# Patient Record
Sex: MALE | Race: WHITE | NOT HISPANIC OR LATINO | Employment: FULL TIME | URBAN - METROPOLITAN AREA
[De-identification: names, ages, dates, MRNs, and addresses within clinical notes are randomized per-mention and may not be internally consistent; named-entity substitution may affect disease eponyms.]

---

## 2018-07-10 ENCOUNTER — OFFICE VISIT (OUTPATIENT)
Dept: OCCUPATIONAL MEDICINE | Facility: CLINIC | Age: 23
End: 2018-07-10

## 2018-07-10 VITALS
TEMPERATURE: 97 F | BODY MASS INDEX: 20.76 KG/M2 | SYSTOLIC BLOOD PRESSURE: 109 MMHG | WEIGHT: 145 LBS | DIASTOLIC BLOOD PRESSURE: 63 MMHG | HEIGHT: 70 IN | RESPIRATION RATE: 18 BRPM | HEART RATE: 76 BPM | OXYGEN SATURATION: 99 %

## 2018-07-10 DIAGNOSIS — Z02.83 ENCOUNTER FOR EMPLOYMENT-RELATED DRUG TESTING: Primary | ICD-10-CM

## 2018-07-10 DIAGNOSIS — S00.81XA ABRASION OF FACE, INITIAL ENCOUNTER: ICD-10-CM

## 2018-07-10 DIAGNOSIS — S05.01XA CORNEA ABRASION, RIGHT, INITIAL ENCOUNTER: ICD-10-CM

## 2018-07-10 LAB
CTP QC/QA: YES
POC 5 PANEL DRUG SCREEN: NEGATIVE

## 2018-07-10 PROCEDURE — 99203 OFFICE O/P NEW LOW 30 MIN: CPT | Mod: S$GLB,,, | Performed by: FAMILY MEDICINE

## 2018-07-10 PROCEDURE — 65205 REMOVE FOREIGN BODY FROM EYE: CPT | Mod: S$GLB,,, | Performed by: PHYSICIAN ASSISTANT

## 2018-07-10 PROCEDURE — 80305 DRUG TEST PRSMV DIR OPT OBS: CPT | Mod: QW,S$GLB,, | Performed by: FAMILY MEDICINE

## 2018-07-10 NOTE — PROGRESS NOTES
Subjective:       Patient ID: Gilmar Spears is a 22 y.o. male.    Chief Complaint: Eye Pain    Pt states an hour ago frame of door at work hit side of his right eye. Denies visual changes, n/v, fever, cp, sob.      Eye Pain    The right eye is affected. This is a new problem. The current episode started today. The problem occurs constantly. The problem has been gradually worsening. The injury mechanism was a direct trauma. The pain is at a severity of 3/10. The pain is mild. There is no known exposure to pink eye. He does not wear contacts. Associated symptoms include blurred vision. Pertinent negatives include no eye redness, fever, nausea, photophobia or vomiting. He has tried nothing for the symptoms. The treatment provided no relief.     Review of Systems   Constitution: Negative for chills and fever.   HENT: Negative for congestion.    Eyes: Positive for blurred vision, pain and visual disturbance. Negative for photophobia and redness.   Gastrointestinal: Negative for nausea and vomiting.   Neurological: Negative for headaches.       Objective:      Physical Exam   Constitutional: He is oriented to person, place, and time. He appears well-developed and well-nourished.   HENT:   Head: Normocephalic and atraumatic.   Right Ear: External ear normal.   Left Ear: External ear normal.   Nose: Nose normal.   Mouth/Throat: Oropharynx is clear and moist.   Eyes: Conjunctivae and EOM are normal. Pupils are equal, round, and reactive to light. Right eye exhibits no chemosis, no discharge, no exudate and no hordeolum. Foreign body present in the right eye. Left eye exhibits no chemosis, no discharge, no exudate and no hordeolum. No foreign body present in the left eye. Right conjunctiva is not injected. Right conjunctiva has no hemorrhage. Left conjunctiva is not injected. Left conjunctiva has no hemorrhage. No scleral icterus.       Neck: Trachea normal, full passive range of motion without pain and phonation normal. Neck  supple.   Musculoskeletal: Normal range of motion.   Neurological: He is alert and oriented to person, place, and time.   Skin: Skin is warm, dry and intact.   Psychiatric: He has a normal mood and affect. His speech is normal and behavior is normal. Judgment and thought content normal. Cognition and memory are normal.   Nursing note and vitals reviewed.      Assessment:       1. Encounter for employment-related drug testing    2. Cornea abrasion, right, initial encounter    3. Abrasion of face, initial encounter        Plan:                   Follow-up if symptoms worsen or fail to improve.  Under flourecin, an FB was identified and removed using cotton tip applicator. There was an underlying corneal abrasion noted. Patient tolerated procedure well with no adverse events. He may RTC as needed, no work restrictions.     Patient Instructions     Artificial Tears eye solution  What is this medicine?  ARTIFICIAL TEARS (ahr tuh FISH omi teerz) eye solution soothes irritation and discomfort caused by dry eyes.  How should I use this medicine?  This medicine is only for use in the eye. Do not take by mouth. Follow the directions on the label. Wash hands before and after use. Tilt the head back slightly and pull down the lower eyelid with your index finger to form a pouch. Try not to touch the tip of the dropper to your eye, fingertips, or any other surface. Squeeze the prescribed number of drops (usually one or two drops) into the pouch. Close the eye gently for a few moments to allow the drops to be in contact with the eye. Use your medicine at regular intervals. Do not use your medicine more often than directed.  Talk to your pediatrician regarding the use of this medicine in children. While this medicine may be used in children as young as 6 years for selected conditions, precautions do apply.  What side effects may I notice from receiving this medicine?  Side effects that you should report to your doctor or health  care professional as soon as possible:  · allergic reactions like skin rash, itching or hives, swelling of the face, lips, or tongue  · change in vision  · eye irritation or redness that gets worse or lasts more than 72 hours  · eye pain  Side effects that usually do not require medical attention (report to your doctor or health care professional if they continue or are bothersome):  · temporary stinging or blurred vision when applying the eye drops  What may interact with this medicine?  Interactions are not expected. If you are using other eye drops with this medicine, separate the application of the different eye drops by roughly 5 minutes. This ensures that the eye drops do not interfere with each other. If you are using both eye drops and an eye ointment, use the eye drops 10 minutes before the eye ointment so that the eye ointment does not interfere with the action of the drops.  What if I miss a dose?  If you miss a dose, use it as soon as you can. If it is almost time for your next dose, use only that dose. Do not use double or extra doses.  Where should I keep my medicine?  Keep out of the reach of children.  Store at room temperature between 15 and 30 degrees C (59 and 86 degrees F). Do not freeze. Throw away any unused medicine after the expiration date. Once the product is opened, most experts recommend discarding the product after 30 days.  What should I tell my health care provider before I take this medicine?  · change in vision  · eye infection or trauma  · wear contact lenses  · an unusual or allergic reaction to artificial tears, other medicines, foods, dyes, or preservatives  · pregnant or trying to get pregnant  · breast-feeding  What should I watch for while using this medicine?  If you experience eye pain, changes in vision, continued redness or irritation of the eye, or if your eye condition gets worse or lasts longer than 72 hours, discontinue use and consult your health care  professional.  To avoid contamination of this product, do not touch the tip of the container to any surface. Do not share this medicine with others. If the product changes color or becomes cloudy, do not use.  If you wear contact lenses, you should remove them before putting the drops in your eyes. Wait at least 15 minutes after putting the drops in your eyes before putting your contact lenses back in.  NOTE:This sheet is a summary. It may not cover all possible information. If you have questions about this medicine, talk to your doctor, pharmacist, or health care provider. Copyright© 2017 [employer]      If you were prescribed a narcotic medication, do not drive or operate heavy equipment or machinery while taking these medications.  You must understand that you've received an Urgent Care treatment only and that you may be released before all your medical problems are known or treated. You, the patient, will arrange for follow up care as instructed.  Follow up with your PCP or specialty clinic as directed in the next 1-2 weeks if not improved or as needed.  You can call [phone] to schedule an appointment with the appropriate provider.  If your condition worsens we recommend that you receive another evaluation at the emergency room immediately or contact your primary medical clinics after hours call service to discuss your concerns.  Please return here or go to the Emergency Department for any concerns or worsening of condition.

## 2018-07-10 NOTE — PATIENT INSTRUCTIONS
Artificial Tears eye solution  What is this medicine?  ARTIFICIAL TEARS (ahr tuh FISH omi teerz) eye solution soothes irritation and discomfort caused by dry eyes.  How should I use this medicine?  This medicine is only for use in the eye. Do not take by mouth. Follow the directions on the label. Wash hands before and after use. Tilt the head back slightly and pull down the lower eyelid with your index finger to form a pouch. Try not to touch the tip of the dropper to your eye, fingertips, or any other surface. Squeeze the prescribed number of drops (usually one or two drops) into the pouch. Close the eye gently for a few moments to allow the drops to be in contact with the eye. Use your medicine at regular intervals. Do not use your medicine more often than directed.  Talk to your pediatrician regarding the use of this medicine in children. While this medicine may be used in children as young as 6 years for selected conditions, precautions do apply.  What side effects may I notice from receiving this medicine?  Side effects that you should report to your doctor or health care professional as soon as possible:  · allergic reactions like skin rash, itching or hives, swelling of the face, lips, or tongue  · change in vision  · eye irritation or redness that gets worse or lasts more than 72 hours  · eye pain  Side effects that usually do not require medical attention (report to your doctor or health care professional if they continue or are bothersome):  · temporary stinging or blurred vision when applying the eye drops  What may interact with this medicine?  Interactions are not expected. If you are using other eye drops with this medicine, separate the application of the different eye drops by roughly 5 minutes. This ensures that the eye drops do not interfere with each other. If you are using both eye drops and an eye ointment, use the eye drops 10 minutes before the eye ointment so that the eye ointment does not  interfere with the action of the drops.  What if I miss a dose?  If you miss a dose, use it as soon as you can. If it is almost time for your next dose, use only that dose. Do not use double or extra doses.  Where should I keep my medicine?  Keep out of the reach of children.  Store at room temperature between 15 and 30 degrees C (59 and 86 degrees F). Do not freeze. Throw away any unused medicine after the expiration date. Once the product is opened, most experts recommend discarding the product after 30 days.  What should I tell my health care provider before I take this medicine?  · change in vision  · eye infection or trauma  · wear contact lenses  · an unusual or allergic reaction to artificial tears, other medicines, foods, dyes, or preservatives  · pregnant or trying to get pregnant  · breast-feeding  What should I watch for while using this medicine?  If you experience eye pain, changes in vision, continued redness or irritation of the eye, or if your eye condition gets worse or lasts longer than 72 hours, discontinue use and consult your health care professional.  To avoid contamination of this product, do not touch the tip of the container to any surface. Do not share this medicine with others. If the product changes color or becomes cloudy, do not use.  If you wear contact lenses, you should remove them before putting the drops in your eyes. Wait at least 15 minutes after putting the drops in your eyes before putting your contact lenses back in.  NOTE:This sheet is a summary. It may not cover all possible information. If you have questions about this medicine, talk to your doctor, pharmacist, or health care provider. Copyright© 2017 [employer]      If you were prescribed a narcotic medication, do not drive or operate heavy equipment or machinery while taking these medications.  You must understand that you've received an Urgent Care treatment only and that you may be released before all your medical  problems are known or treated. You, the patient, will arrange for follow up care as instructed.  Follow up with your PCP or specialty clinic as directed in the next 1-2 weeks if not improved or as needed.  You can call [phone] to schedule an appointment with the appropriate provider.  If your condition worsens we recommend that you receive another evaluation at the emergency room immediately or contact your primary medical clinics after hours call service to discuss your concerns.  Please return here or go to the Emergency Department for any concerns or worsening of condition.

## 2018-07-10 NOTE — LETTER
[facility]  [address], [address]  [place] [address]  Phone: [phone]  Fax: [phone]    Pt Name: Gilmar Spears  Injury Date: 07/10/2018   Employee ID:  Date of First Treatment: 07/10/2018   Company: [employer]            Appointment Time: 02:35 PM Arrived:  2:50 PM CDT    Time Out:[id number]   Physician: [facility] PROVID[facility], [facility]        Office Treatment: Gilmar was seen today for eye pain.    Diagnoses and all orders for this visit:    Encounter for employment-related drug testing  -     POCT Rapid Drug Screen 5 Panel    Cornea abrasion, right, initial encounter    Abrasion of face, initial encounter                    Return Appointment: as needed.   Patient can RTW full duty

## 2020-03-17 ENCOUNTER — OCCUPATIONAL HEALTH (OUTPATIENT)
Dept: URGENT CARE | Facility: CLINIC | Age: 25
End: 2020-03-17

## 2020-03-17 DIAGNOSIS — Z02.1 PRE-EMPLOYMENT EXAMINATION: Primary | ICD-10-CM

## 2020-03-17 LAB
BREATH ALCOHOL: NEGATIVE
CTP QC/QA: YES
CTP QC/QA: YES
FECAL OCCULT BLOOD, POC: NEGATIVE
POC 10 PANEL DRUG SCREEN: NEGATIVE

## 2020-03-18 LAB
ALBUMIN SERPL-MCNC: 4.9 G/DL (ref 4.1–5.2)
ALBUMIN/GLOB SERPL: 1.9 {RATIO} (ref 1.2–2.2)
ALP SERPL-CCNC: 68 IU/L (ref 39–117)
ALT SERPL-CCNC: 30 IU/L (ref 0–44)
APPEARANCE UR: CLEAR
AST SERPL-CCNC: 28 IU/L (ref 0–40)
BASOPHILS # BLD AUTO: 0 X10E3/UL (ref 0–0.2)
BASOPHILS NFR BLD AUTO: 0 %
BILIRUB SERPL-MCNC: 0.4 MG/DL (ref 0–1.2)
BILIRUB UR QL STRIP: NEGATIVE
BUN SERPL-MCNC: 15 MG/DL (ref 6–20)
BUN/CREAT SERPL: 16 (ref 9–20)
CALCIUM SERPL-MCNC: 9.7 MG/DL (ref 8.7–10.2)
CHLORIDE SERPL-SCNC: 101 MMOL/L (ref 96–106)
CHOLEST SERPL-MCNC: 143 MG/DL (ref 100–199)
CO2 SERPL-SCNC: 22 MMOL/L (ref 20–29)
COLOR UR: YELLOW
CREAT SERPL-MCNC: 0.92 MG/DL (ref 0.76–1.27)
EOSINOPHIL # BLD AUTO: 0.2 X10E3/UL (ref 0–0.4)
EOSINOPHIL NFR BLD AUTO: 3 %
ERYTHROCYTE [DISTWIDTH] IN BLOOD BY AUTOMATED COUNT: 13.7 % (ref 11.6–15.4)
GGT SERPL-CCNC: 20 IU/L (ref 0–65)
GLOBULIN SER CALC-MCNC: 2.6 G/DL (ref 1.5–4.5)
GLUCOSE SERPL-MCNC: 94 MG/DL (ref 65–99)
GLUCOSE UR QL: NEGATIVE
HCT VFR BLD AUTO: 43.7 % (ref 37.5–51)
HDLC SERPL-MCNC: 38 MG/DL
HGB BLD-MCNC: 14.4 G/DL (ref 13–17.7)
HGB UR QL STRIP: NEGATIVE
HIV 1+2 AB+HIV1 P24 AG SERPL QL IA: NON REACTIVE
IMM GRANULOCYTES # BLD AUTO: 0 X10E3/UL (ref 0–0.1)
IMM GRANULOCYTES NFR BLD AUTO: 0 %
KETONES UR QL STRIP: NEGATIVE
LDLC SERPL CALC-MCNC: 78 MG/DL (ref 0–99)
LEUKOCYTE ESTERASE UR QL STRIP: NEGATIVE
LYMPHOCYTES # BLD AUTO: 1.8 X10E3/UL (ref 0.7–3.1)
LYMPHOCYTES NFR BLD AUTO: 25 %
MCH RBC QN AUTO: 31.8 PG (ref 26.6–33)
MCHC RBC AUTO-ENTMCNC: 33 G/DL (ref 31.5–35.7)
MCV RBC AUTO: 97 FL (ref 79–97)
MICRO URNS: NORMAL
MONOCYTES # BLD AUTO: 0.5 X10E3/UL (ref 0.1–0.9)
MONOCYTES NFR BLD AUTO: 7 %
NEUTROPHILS # BLD AUTO: 4.6 X10E3/UL (ref 1.4–7)
NEUTROPHILS NFR BLD AUTO: 65 %
NITRITE UR QL STRIP: NEGATIVE
PH UR STRIP: 7 [PH] (ref 5–7.5)
PLATELET # BLD AUTO: 188 X10E3/UL (ref 150–450)
POTASSIUM SERPL-SCNC: 4.7 MMOL/L (ref 3.5–5.2)
PROT SERPL-MCNC: 7.5 G/DL (ref 6–8.5)
PROT UR QL STRIP: NEGATIVE
RBC # BLD AUTO: 4.53 X10E6/UL (ref 4.14–5.8)
RPR SER QL: NON REACTIVE
SODIUM SERPL-SCNC: 143 MMOL/L (ref 134–144)
SP GR UR: 1.02 (ref 1–1.03)
TRIGL SERPL-MCNC: 133 MG/DL (ref 0–149)
UROBILINOGEN UR STRIP-MCNC: 0.2 MG/DL (ref 0.2–1)
VLDLC SERPL CALC-MCNC: 27 MG/DL (ref 5–40)
WBC # BLD AUTO: 7.2 X10E3/UL (ref 3.4–10.8)

## 2021-05-12 ENCOUNTER — PATIENT MESSAGE (OUTPATIENT)
Dept: RESEARCH | Facility: HOSPITAL | Age: 26
End: 2021-05-12

## 2024-02-22 ENCOUNTER — TELEPHONE (OUTPATIENT)
Dept: NEUROSURGERY | Facility: CLINIC | Age: 29
End: 2024-02-22
Payer: COMMERCIAL

## 2024-02-22 NOTE — TELEPHONE ENCOUNTER
LVM in regards to referral from Jenny Delacruz.  Patient does have current CTA, but needs to be uploaded to chart.  No current imaging in chart.  Referral scanned into .

## 2024-02-23 ENCOUNTER — PATIENT MESSAGE (OUTPATIENT)
Dept: NEUROSURGERY | Facility: CLINIC | Age: 29
End: 2024-02-23
Payer: COMMERCIAL

## 2024-02-23 ENCOUNTER — TELEPHONE (OUTPATIENT)
Dept: NEUROSURGERY | Facility: CLINIC | Age: 29
End: 2024-02-23
Payer: COMMERCIAL

## 2024-02-23 NOTE — TELEPHONE ENCOUNTER
PP message sent.  Unable to contact patient.  Girlfriend is not in patient documents to be able to speak to in regards to patient care.    ----- Message from Sonam Ahmadi sent at 2/23/2024 11:05 AM CST -----  Type: Need Medical Advice   Who Called: Baldomero girlfriend of patient  Best callback number :   Additional Information: girlfriend of patient called to schedule the appointment due to the patient working and not being able to answer his phone   Please call to further assist, Thanks.

## 2024-03-08 ENCOUNTER — TELEPHONE (OUTPATIENT)
Dept: NEUROSURGERY | Facility: CLINIC | Age: 29
End: 2024-03-08
Payer: COMMERCIAL

## 2024-03-08 NOTE — TELEPHONE ENCOUNTER
LVM in regards to imaging.  Needs to bring to [facility] to be uploaded before appt on Thursday.

## 2024-03-14 ENCOUNTER — OFFICE VISIT (OUTPATIENT)
Dept: NEUROSURGERY | Facility: CLINIC | Age: 29
End: 2024-03-14
Payer: COMMERCIAL

## 2024-03-14 VITALS
WEIGHT: 147.06 LBS | HEIGHT: 71 IN | SYSTOLIC BLOOD PRESSURE: 123 MMHG | HEART RATE: 79 BPM | RESPIRATION RATE: 18 BRPM | DIASTOLIC BLOOD PRESSURE: 74 MMHG | BODY MASS INDEX: 20.59 KG/M2

## 2024-03-14 DIAGNOSIS — Q28.2 ARTERIOVENOUS MALFORMATION OF CEREBRAL VESSELS: Primary | ICD-10-CM

## 2024-03-14 PROCEDURE — 3008F BODY MASS INDEX DOCD: CPT | Mod: CPTII,S$GLB,, | Performed by: NEUROLOGICAL SURGERY

## 2024-03-14 PROCEDURE — 3074F SYST BP LT 130 MM HG: CPT | Mod: CPTII,S$GLB,, | Performed by: NEUROLOGICAL SURGERY

## 2024-03-14 PROCEDURE — 1159F MED LIST DOCD IN RCRD: CPT | Mod: CPTII,S$GLB,, | Performed by: NEUROLOGICAL SURGERY

## 2024-03-14 PROCEDURE — 3078F DIAST BP <80 MM HG: CPT | Mod: CPTII,S$GLB,, | Performed by: NEUROLOGICAL SURGERY

## 2024-03-14 PROCEDURE — 99205 OFFICE O/P NEW HI 60 MIN: CPT | Mod: S$GLB,,, | Performed by: NEUROLOGICAL SURGERY

## 2024-03-14 RX ORDER — DOXYCYCLINE 100 MG/1
100 CAPSULE ORAL EVERY 12 HOURS
Status: ON HOLD | COMMUNITY
Start: 2024-03-10 | End: 2024-06-15 | Stop reason: HOSPADM

## 2024-03-14 NOTE — PROGRESS NOTES
Neurosurgery History & Physical    Patient ID: Gilmar Spears is a 28 y.o. male.    No chief complaint on file.      HPI:  Mr. Spears is a 28 year old gentleman with history of chronic headaches who more recently (for maybe the last 6 months) has had throbbing headaches on the left side of his head centered around his temple.  His last significant one was on Sunday (4 days ago).  Prior to that it had been a month prior.  Sometimes he feels like significant straining or laughing will bring on the headaches.      He denies weakness/numnbess/vision changes or other issues.    He has never seen a headaches specialist.    He does not smoke cigarettes but does vape.    He does not have high blood pressure.    His mother suffered a ruptured brain aneurysm two years ago. They are not aware of other family history of brain aneurysms.    Review of Systems   Constitutional:  Negative for activity change and fever.   HENT:  Negative for rhinorrhea, tinnitus, trouble swallowing and voice change.    Eyes:  Negative for visual disturbance.   Respiratory:  Negative for shortness of breath.    Cardiovascular:  Negative for chest pain.   Gastrointestinal:  Negative for nausea and vomiting.   Endocrine: Negative for cold intolerance, heat intolerance, polydipsia, polyphagia and polyuria.   Genitourinary:  Negative for decreased urine volume, frequency and urgency.   Musculoskeletal:  Negative for back pain, neck pain and neck stiffness.   Neurological:  Positive for headaches. Negative for dizziness, tremors, seizures, syncope, facial asymmetry, speech difficulty, weakness, light-headedness and numbness.   Psychiatric/Behavioral:  Negative for confusion.        No past medical history on file.  Social History     Socioeconomic History    Marital status: Single   Tobacco Use    Smoking status: Smoker, Current Status Unknown     Types: Cigarettes    Smokeless tobacco: Never     Family History   Problem Relation Age of Onset    No Known  Problems Mother     No Known Problems Father      Review of patient's allergies indicates:   Allergen Reactions    Amoxicillin Swelling     No current outpatient medications on file.  There were no vitals taken for this visit.      Neurologic Exam     Mental Status   Oriented to person, place, and time.   Attention: normal.   Speech: speech is normal   Level of consciousness: alert  Knowledge: good.     Cranial Nerves     CN II   Visual fields full to confrontation.     CN III, IV, VI   Pupils are equal, round, and reactive to light.  Extraocular motions are normal.     CN V   Facial sensation intact.     CN VII   Facial expression full, symmetric.     CN VIII   CN VIII normal.     CN XI   CN XI normal.     CN XII   CN XII normal.     Motor Exam   Muscle bulk: normal  Overall muscle tone: normal    Strength   Strength 5/5 throughout.     Sensory Exam   Light touch normal.     Gait, Coordination, and Reflexes     Gait  Gait: normal    Coordination   Romberg: negative      Physical Exam  Eyes:      Extraocular Movements: EOM normal.      Pupils: Pupils are equal, round, and reactive to light.   Neurological:      Mental Status: He is oriented to person, place, and time.      Motor: Motor strength is normal.     Coordination: Romberg Test normal.      Gait: Gait is intact.   Psychiatric:         Speech: Speech normal.       Imaging:  CT angiogram of the head dated 02/13/2024 is personally reviewed and discussed with the patient.  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  This measures approximately 3.5 mm in maximal dimension.    Assessment/Plan:   Mr. 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      Plan is to follow-up in two months with MRA head with and without contrast.      I discussed the natural history of unruptured intracranial aneurysms.  I quoted him an approximately 0.5% annual risk of  bleeding in accordance with ISUIA data.      I discussed modifiable risk factors including hypertension control and avoiding smoking.

## 2024-05-15 ENCOUNTER — OFFICE VISIT (OUTPATIENT)
Dept: NEUROSURGERY | Facility: CLINIC | Age: 29
End: 2024-05-15
Payer: COMMERCIAL

## 2024-05-15 ENCOUNTER — HOSPITAL ENCOUNTER (OUTPATIENT)
Dept: RADIOLOGY | Facility: HOSPITAL | Age: 29
Discharge: HOME | End: 2024-05-15
Attending: NEUROLOGICAL SURGERY
Payer: COMMERCIAL

## 2024-05-15 VITALS
HEART RATE: 83 BPM | SYSTOLIC BLOOD PRESSURE: 113 MMHG | BODY MASS INDEX: 20.51 KG/M2 | DIASTOLIC BLOOD PRESSURE: 64 MMHG | HEIGHT: 71 IN | RESPIRATION RATE: 18 BRPM

## 2024-05-15 DIAGNOSIS — Q28.2 ARTERIOVENOUS MALFORMATION OF CEREBRAL VESSELS: ICD-10-CM

## 2024-05-15 DIAGNOSIS — I67.1 BRAIN ANEURYSM: Primary | ICD-10-CM

## 2024-05-15 PROCEDURE — 70546 MR ANGIOGRAPH HEAD W/O&W/DYE: CPT | Mod: TC,PO

## 2024-05-15 PROCEDURE — 3008F BODY MASS INDEX DOCD: CPT | Mod: CPTII,S$GLB,, | Performed by: NEUROLOGICAL SURGERY

## 2024-05-15 PROCEDURE — A9585 GADOBUTROL INJECTION: HCPCS | Mod: PO | Performed by: NEUROLOGICAL SURGERY

## 2024-05-15 PROCEDURE — 3074F SYST BP LT 130 MM HG: CPT | Mod: CPTII,S$GLB,, | Performed by: NEUROLOGICAL SURGERY

## 2024-05-15 PROCEDURE — 70546 MR ANGIOGRAPH HEAD W/O&W/DYE: CPT | Mod: 26,,, | Performed by: RADIOLOGY

## 2024-05-15 PROCEDURE — 1159F MED LIST DOCD IN RCRD: CPT | Mod: CPTII,S$GLB,, | Performed by: NEUROLOGICAL SURGERY

## 2024-05-15 PROCEDURE — 3078F DIAST BP <80 MM HG: CPT | Mod: CPTII,S$GLB,, | Performed by: NEUROLOGICAL SURGERY

## 2024-05-15 PROCEDURE — 99215 OFFICE O/P EST HI 40 MIN: CPT | Mod: S$GLB,,, | Performed by: NEUROLOGICAL SURGERY

## 2024-05-15 PROCEDURE — 25500020 PHARM REV CODE 255: Mod: PO | Performed by: NEUROLOGICAL SURGERY

## 2024-05-15 RX ORDER — GADOBUTROL 604.72 MG/ML
6 INJECTION INTRAVENOUS
Status: COMPLETED | OUTPATIENT
Start: 2024-05-15 | End: 2024-05-15

## 2024-05-15 RX ADMIN — GADOBUTROL 6 ML: 604.72 INJECTION INTRAVENOUS at 09:05

## 2024-05-15 NOTE — PROGRESS NOTES
"Neurosurgery History & Physical    Patient ID: Gilmar Spears is a 28 y.o. male.    Chief Complaint   Patient presents with    Follow-up     Wants to discuss iaging and options going forward.     Interval HPI 5/15/2024:  Mr. Spears denies significant new symptoms since last visit.  He does continue to have left temporal headaches that happen more with straining.      HPI 3/14/2024:  Mr. Spears is a 28 year old gentleman with history of chronic headaches who more recently (for maybe the last 6 months) has had throbbing headaches on the left side of his head centered around his temple.  His last significant one was on Sunday (4 days ago).  Prior to that it had been a month prior.  Sometimes he feels like significant straining or laughing will bring on the headaches.       He denies weakness/numnbess/vision changes or other issues.     He has never seen a headaches specialist.     He does not smoke cigarettes but does vape.     He does not have high blood pressure.     His mother suffered a ruptured brain aneurysm two years ago. They are not aware of other family history of brain aneurysms.    Review of Systems  Denies F/C/N/V.  + HA    History reviewed. No pertinent past medical history.  Social History     Socioeconomic History    Marital status: Single   Tobacco Use    Smoking status: Smoker, Current Status Unknown     Types: Cigarettes    Smokeless tobacco: Never     Family History   Problem Relation Name Age of Onset    No Known Problems Mother      No Known Problems Father       Review of patient's allergies indicates:   Allergen Reactions    Amoxicillin Swelling    Iodinated contrast media Swelling       Current Outpatient Medications:     doxycycline (MONODOX) 100 MG capsule, Take 100 mg by mouth every 12 (twelve) hours. (Patient not taking: Reported on 5/15/2024), Disp: , Rfl:   No current facility-administered medications for this visit.  Blood pressure 113/64, pulse 83, resp. rate 18, height 5' 11" (1.803 " m).      Neurologic Exam  AAOx4, NAD  Strength   Deltoids Triceps Biceps Wrist Extension Wrist Flexion Hand    Upper: R 5/5 5/5 5/5 5/5 5/5 5/5     L 5/5 5/5 5/5 5/5 5/5 5/5       Iliopsoas Quadriceps Knee  Flexion Tibialis  anterior Gastro- cnemius EHL   Lower: R 5/5 5/5 5/5 5/5 5/5 5/5     L 5/5 5/5 5/5 5/5 5/5 5/5      Sensation grossly intact to light touch in bilateral upper and lower extremities    Imaging:  MRA of the brain with and without contrast dated 05/15/2024 is personally reviewed and discussed with the patient.  There is no change in the left sided internal carotid artery aneurysm when compared to outside CTA.  The aneurysm has a wide neck and is on the superior surface of the ICA in the communicating segment.      Assessment/Plan:   Mr. Spears is a 28 year old gentleman with atypical, new headaches which are located in the left temporal area and are associated with straining.    He has a left ICA aneurysm which is not in typical location for saccular aneurysms.  He has family history of aneurysmal rupture (mother).      Given his young age and these multiple risk factors, I feel that offering pipeline embolization is reasonable.  I discussed risks of treatmetn including aneurysmal hemorrhage, remote hemorrhage, stroke, coma and death.      I quoted the annual risk of aneurysm rupture as less than 0.5% annually however it is unclear how much family history, atypical headaches, and atypical aneurysmal location may influence this number.    Overall the aneurysm causes quite a bit of anxiety for him and he is avoiding doing many activities he would more typically participate in.    After thorough discussion he feels like he would like to move forward with treatment.

## 2024-05-20 DIAGNOSIS — Q28.2 ARTERIOVENOUS MALFORMATION OF CEREBRAL VESSELS: Primary | ICD-10-CM

## 2024-05-21 ENCOUNTER — TELEPHONE (OUTPATIENT)
Dept: NEUROSURGERY | Facility: CLINIC | Age: 29
End: 2024-05-21
Payer: COMMERCIAL

## 2024-05-21 NOTE — TELEPHONE ENCOUNTER
----- Message from Obdulia Clements LPN sent at 5/17/2024  3:59 PM CDT -----  Regarding: FW: advice    ----- Message -----  From: Yuriy Kelley  Sent: 5/17/2024   2:52 PM CDT  To: [facility] Staff  Subject: advice                                           Type: Needs Medical Advice  Who Called:  Baldomero farnsworth  Symptoms (please be specific):    How long has patient had these symptoms:    Pharmacy name and phone #:    Best Call Back Number: [phone]  Additional Information: She is calling to notify the provider that the pt would like to go through with the procedure. Please call to advise . Thanks

## 2024-05-29 ENCOUNTER — TELEPHONE (OUTPATIENT)
Dept: NEUROSURGERY | Facility: CLINIC | Age: 29
End: 2024-05-29
Payer: COMMERCIAL

## 2024-05-29 DIAGNOSIS — Q28.2 ARTERIOVENOUS MALFORMATION OF CEREBRAL VESSELS: Primary | ICD-10-CM

## 2024-05-31 ENCOUNTER — TELEPHONE (OUTPATIENT)
Dept: NEUROSURGERY | Facility: CLINIC | Age: 29
End: 2024-05-31
Payer: COMMERCIAL

## 2024-05-31 DIAGNOSIS — Q28.2 ARTERIOVENOUS MALFORMATION OF CEREBRAL VESSELS: Primary | ICD-10-CM

## 2024-05-31 RX ORDER — ASPIRIN 81 MG/1
81 TABLET ORAL DAILY
Qty: 14 TABLET | Refills: 0 | Status: SHIPPED | OUTPATIENT
Start: 2024-05-31 | End: 2024-06-13 | Stop reason: ALTCHOICE

## 2024-05-31 RX ORDER — CLOPIDOGREL BISULFATE 75 MG/1
75 TABLET ORAL DAILY
Qty: 14 TABLET | Refills: 0 | Status: SHIPPED | OUTPATIENT
Start: 2024-05-31 | End: 2024-06-05

## 2024-06-03 ENCOUNTER — TELEPHONE (OUTPATIENT)
Dept: NEUROSURGERY | Facility: CLINIC | Age: 29
End: 2024-06-03
Payer: COMMERCIAL

## 2024-06-03 NOTE — TELEPHONE ENCOUNTER
-pt trinh called stating that pt has broken out in hives and started to itch since starting plavix and asprin that was prescribed on 5/31/24  -itching and hives started the next night on 6/01/24 before taking his shower and alfredneto stated they have not changed anything else  -pt is still breathing normally and has taken a benydril which helped hives and itching go down  -told pt trinh that if symptoms worsen to go to the ER/call 911 immediately   -asked pt trinh if he has ever taken asprin and she stated that she believes so but is unsure  -instructed her to tell him to stop taking both medications for now   -states that pt head has been hurting more and increased this week     -messaged FAREED Kim

## 2024-06-05 ENCOUNTER — TELEPHONE (OUTPATIENT)
Dept: NEUROSURGERY | Facility: CLINIC | Age: 29
End: 2024-06-05
Payer: COMMERCIAL

## 2024-06-05 DIAGNOSIS — Q28.2 ARTERIOVENOUS MALFORMATION OF CEREBRAL VESSELS: Primary | ICD-10-CM

## 2024-06-05 NOTE — TELEPHONE ENCOUNTER
I spoke Mr. SpearsMaegan tsang today who reports that he has been taking the Aspirin 81mg without issue since Monday 6/3.  I informed her that I am prescribing Brilinta to replace the Plavix.  She is aware that the medication was e-prescribed to the pharmacy on file.  I advised that she should call 911 if any allergic response develops.  She verbalized understanding.

## 2024-06-06 DIAGNOSIS — Z91.041 ALLERGY TO IODINATED CONTRAST: Primary | ICD-10-CM

## 2024-06-06 RX ORDER — PREDNISONE 50 MG/1
50 TABLET ORAL
Qty: 3 TABLET | Refills: 0 | Status: ON HOLD | OUTPATIENT
Start: 2024-06-06 | End: 2024-06-15 | Stop reason: HOSPADM

## 2024-06-06 RX ORDER — DIPHENHYDRAMINE HCL 50 MG
50 CAPSULE ORAL ONCE
Qty: 1 CAPSULE | Refills: 0 | Status: SHIPPED | OUTPATIENT
Start: 2024-06-06 | End: 2024-06-06

## 2024-06-07 ENCOUNTER — TELEPHONE (OUTPATIENT)
Dept: INTERVENTIONAL RADIOLOGY/VASCULAR | Facility: CLINIC | Age: 29
End: 2024-06-07
Payer: COMMERCIAL

## 2024-06-11 ENCOUNTER — LAB VISIT (OUTPATIENT)
Dept: LAB | Facility: HOSPITAL | Age: 29
End: 2024-06-11
Payer: COMMERCIAL

## 2024-06-11 DIAGNOSIS — Q28.2 ARTERIOVENOUS MALFORMATION, CEREBRAL: Primary | ICD-10-CM

## 2024-06-11 DIAGNOSIS — Q28.2 ARTERIOVENOUS MALFORMATION, CEREBRAL: ICD-10-CM

## 2024-06-11 DIAGNOSIS — Q28.2 ARTERIOVENOUS MALFORMATION OF CEREBRAL VESSELS: Primary | ICD-10-CM

## 2024-06-12 ENCOUNTER — DOCUMENTATION ONLY (OUTPATIENT)
Dept: PREADMISSION TESTING | Facility: HOSPITAL | Age: 29
End: 2024-06-12
Payer: COMMERCIAL

## 2024-06-12 ENCOUNTER — PATIENT MESSAGE (OUTPATIENT)
Dept: INTERVENTIONAL RADIOLOGY/VASCULAR | Facility: HOSPITAL | Age: 29
End: 2024-06-12
Payer: COMMERCIAL

## 2024-06-12 NOTE — PRE-PROCEDURE INSTRUCTIONS
PRE-OP INSTRUCTIONS:  Instructed patient to have no food,milk or milk products after midnight 6/13/2024  It is ok to take AM medications with a few sips of water   Medication instructions for pm prior to and am of surgery reviewed.  Instructed patient to avoid taking vitamins,supplements or ibuprofen the am of surgery.  Shower instructions provided    Patient denies any familial side effects or issues with anesthesia or sedation. This will be the patient's first anesthesia

## 2024-06-13 ENCOUNTER — ANESTHESIA EVENT (OUTPATIENT)
Dept: INTERVENTIONAL RADIOLOGY/VASCULAR | Facility: HOSPITAL | Age: 29
DRG: 027 | End: 2024-06-13
Payer: COMMERCIAL

## 2024-06-13 DIAGNOSIS — Q28.2 ARTERIOVENOUS MALFORMATION OF CEREBRAL VESSELS: Primary | ICD-10-CM

## 2024-06-13 RX ORDER — ASPIRIN 325 MG
325 TABLET ORAL DAILY
Qty: 30 TABLET | Refills: 5 | Status: SHIPPED | OUTPATIENT
Start: 2024-06-15

## 2024-06-14 ENCOUNTER — HOSPITAL ENCOUNTER (INPATIENT)
Dept: INTERVENTIONAL RADIOLOGY/VASCULAR | Facility: HOSPITAL | Age: 29
LOS: 1 days | Discharge: HOME | DRG: 027 | End: 2024-06-15
Attending: NEUROLOGICAL SURGERY | Admitting: PSYCHIATRY & NEUROLOGY
Payer: COMMERCIAL

## 2024-06-14 ENCOUNTER — ANESTHESIA (OUTPATIENT)
Dept: INTERVENTIONAL RADIOLOGY/VASCULAR | Facility: HOSPITAL | Age: 29
DRG: 027 | End: 2024-06-14
Payer: COMMERCIAL

## 2024-06-14 DIAGNOSIS — I67.1 CEREBRAL ANEURYSM WITHOUT RUPTURE: Primary | ICD-10-CM

## 2024-06-14 DIAGNOSIS — Q28.2 ARTERIOVENOUS MALFORMATION OF CEREBRAL VESSELS: ICD-10-CM

## 2024-06-14 LAB — POCT GLUCOSE: 142 MG/DL (ref 70–110)

## 2024-06-14 PROCEDURE — 61624 TCAT PERM OCCLS/EMBOLJ CNS: CPT | Performed by: NEUROLOGICAL SURGERY

## 2024-06-14 PROCEDURE — C1760 CLOSURE DEV, VASC: HCPCS

## 2024-06-14 PROCEDURE — 25000242 PHARM REV CODE 250 ALT 637 W/ HCPCS: Performed by: PHYSICIAN ASSISTANT

## 2024-06-14 PROCEDURE — 20000000 HC ICU ROOM

## 2024-06-14 PROCEDURE — B317YZZ FLUOROSCOPY OF LEFT INTERNAL CAROTID ARTERY USING OTHER CONTRAST: ICD-10-PCS | Performed by: NEUROLOGICAL SURGERY

## 2024-06-14 PROCEDURE — 27201082 IR ANGIOGRAM CAROTID INTERNAL INC ARCH AND CEREBRAL BILAT

## 2024-06-14 PROCEDURE — 75894 X-RAYS TRANSCATH THERAPY: CPT | Mod: TC | Performed by: NEUROLOGICAL SURGERY

## 2024-06-14 PROCEDURE — 75894 X-RAYS TRANSCATH THERAPY: CPT | Mod: 26,,, | Performed by: NEUROLOGICAL SURGERY

## 2024-06-14 PROCEDURE — 37000008 HC ANESTHESIA 1ST 15 MINUTES: Performed by: ANESTHESIOLOGY

## 2024-06-14 PROCEDURE — 61624 TCAT PERM OCCLS/EMBOLJ CNS: CPT | Mod: ,,, | Performed by: NEUROLOGICAL SURGERY

## 2024-06-14 PROCEDURE — 37000009 HC ANESTHESIA EA ADD 15 MINS: Performed by: ANESTHESIOLOGY

## 2024-06-14 PROCEDURE — 27201076 IR ANGIOGRAM CAROTID INTERNAL INC ARCH AND CEREBRAL BILAT

## 2024-06-14 PROCEDURE — 25500020 PHARM REV CODE 255: Performed by: NEUROLOGICAL SURGERY

## 2024-06-14 PROCEDURE — 99223 1ST HOSP IP/OBS HIGH 75: CPT | Mod: ,,, | Performed by: PHYSICIAN ASSISTANT

## 2024-06-14 PROCEDURE — 03VG3HZ RESTRICTION OF INTRACRANIAL ARTERY WITH INTRALUMINAL DEVICE, FLOW DIVERTER, PERCUTANEOUS APPROACH: ICD-10-PCS | Performed by: NEUROLOGICAL SURGERY

## 2024-06-14 PROCEDURE — 25000003 PHARM REV CODE 250: Performed by: NEUROLOGICAL SURGERY

## 2024-06-14 PROCEDURE — 75898 FOLLOW-UP ANGIOGRAPHY: CPT | Mod: TC | Performed by: NEUROLOGICAL SURGERY

## 2024-06-14 PROCEDURE — B314YZZ FLUOROSCOPY OF LEFT COMMON CAROTID ARTERY USING OTHER CONTRAST: ICD-10-PCS | Performed by: NEUROLOGICAL SURGERY

## 2024-06-14 PROCEDURE — 36224 PLACE CATH CAROTD ART: CPT | Mod: 51,LT,, | Performed by: NEUROLOGICAL SURGERY

## 2024-06-14 PROCEDURE — 94761 N-INVAS EAR/PLS OXIMETRY MLT: CPT

## 2024-06-14 PROCEDURE — 36224 PLACE CATH CAROTD ART: CPT | Mod: LT | Performed by: NEUROLOGICAL SURGERY

## 2024-06-14 PROCEDURE — C1769 GUIDE WIRE: HCPCS

## 2024-06-14 PROCEDURE — 63600175 PHARM REV CODE 636 W HCPCS: Performed by: NURSE ANESTHETIST, CERTIFIED REGISTERED

## 2024-06-14 PROCEDURE — 25000003 PHARM REV CODE 250: Performed by: NURSE ANESTHETIST, CERTIFIED REGISTERED

## 2024-06-14 PROCEDURE — 76377 3D RENDER W/INTRP POSTPROCES: CPT | Mod: 26,,, | Performed by: NEUROLOGICAL SURGERY

## 2024-06-14 PROCEDURE — 76377 3D RENDER W/INTRP POSTPROCES: CPT | Mod: TC | Performed by: NEUROLOGICAL SURGERY

## 2024-06-14 PROCEDURE — 99499 UNLISTED E&M SERVICE: CPT | Mod: ,,, | Performed by: PSYCHIATRY & NEUROLOGY

## 2024-06-14 PROCEDURE — 25000003 PHARM REV CODE 250: Performed by: PHYSICIAN ASSISTANT

## 2024-06-14 PROCEDURE — 75898 FOLLOW-UP ANGIOGRAPHY: CPT | Mod: 26,,, | Performed by: NEUROLOGICAL SURGERY

## 2024-06-14 RX ORDER — PROPOFOL 10 MG/ML
VIAL (ML) INTRAVENOUS
Status: DISCONTINUED | OUTPATIENT
Start: 2024-06-14 | End: 2024-06-14

## 2024-06-14 RX ORDER — MIDAZOLAM HYDROCHLORIDE 1 MG/ML
INJECTION INTRAMUSCULAR; INTRAVENOUS
Status: DISCONTINUED | OUTPATIENT
Start: 2024-06-14 | End: 2024-06-14

## 2024-06-14 RX ORDER — LIDOCAINE HYDROCHLORIDE 10 MG/ML
1 INJECTION, SOLUTION EPIDURAL; INFILTRATION; INTRACAUDAL; PERINEURAL ONCE
Status: DISCONTINUED | OUTPATIENT
Start: 2024-06-14 | End: 2024-06-15 | Stop reason: HOSPADM

## 2024-06-14 RX ORDER — ASPIRIN 325 MG
325 TABLET ORAL DAILY
Status: DISCONTINUED | OUTPATIENT
Start: 2024-06-16 | End: 2024-06-15 | Stop reason: HOSPADM

## 2024-06-14 RX ORDER — ACETAMINOPHEN 325 MG/1
650 TABLET ORAL EVERY 4 HOURS PRN
Status: DISCONTINUED | OUTPATIENT
Start: 2024-06-14 | End: 2024-06-15 | Stop reason: HOSPADM

## 2024-06-14 RX ORDER — ROCURONIUM BROMIDE 10 MG/ML
INJECTION, SOLUTION INTRAVENOUS
Status: DISCONTINUED | OUTPATIENT
Start: 2024-06-14 | End: 2024-06-14

## 2024-06-14 RX ORDER — SODIUM CHLORIDE 0.9 % (FLUSH) 0.9 %
10 SYRINGE (ML) INJECTION
Status: DISCONTINUED | OUTPATIENT
Start: 2024-06-14 | End: 2024-06-15 | Stop reason: HOSPADM

## 2024-06-14 RX ORDER — FENTANYL CITRATE 50 UG/ML
25 INJECTION, SOLUTION INTRAMUSCULAR; INTRAVENOUS EVERY 5 MIN PRN
OUTPATIENT
Start: 2024-06-14

## 2024-06-14 RX ORDER — SODIUM CHLORIDE 9 MG/ML
INJECTION, SOLUTION INTRAVENOUS CONTINUOUS
Status: DISCONTINUED | OUTPATIENT
Start: 2024-06-14 | End: 2024-06-14

## 2024-06-14 RX ORDER — ONDANSETRON HYDROCHLORIDE 2 MG/ML
INJECTION, SOLUTION INTRAVENOUS
Status: DISCONTINUED | OUTPATIENT
Start: 2024-06-14 | End: 2024-06-14

## 2024-06-14 RX ORDER — HEPARIN SODIUM 1000 [USP'U]/ML
INJECTION, SOLUTION INTRAVENOUS; SUBCUTANEOUS
Status: DISCONTINUED | OUTPATIENT
Start: 2024-06-14 | End: 2024-06-14

## 2024-06-14 RX ORDER — IODIXANOL 320 MG/ML
200 INJECTION, SOLUTION INTRAVASCULAR
Status: COMPLETED | OUTPATIENT
Start: 2024-06-14 | End: 2024-06-14

## 2024-06-14 RX ORDER — NICARDIPINE HYDROCHLORIDE 0.2 MG/ML
0-15 INJECTION INTRAVENOUS CONTINUOUS
Status: DISCONTINUED | OUTPATIENT
Start: 2024-06-14 | End: 2024-06-14

## 2024-06-14 RX ORDER — DIPHENHYDRAMINE HCL 50 MG
50 CAPSULE ORAL ONCE
COMMUNITY

## 2024-06-14 RX ORDER — HALOPERIDOL 5 MG/ML
0.5 INJECTION INTRAMUSCULAR EVERY 10 MIN PRN
OUTPATIENT
Start: 2024-06-14

## 2024-06-14 RX ORDER — ONDANSETRON HYDROCHLORIDE 2 MG/ML
4 INJECTION, SOLUTION INTRAVENOUS EVERY 8 HOURS PRN
Status: DISCONTINUED | OUTPATIENT
Start: 2024-06-14 | End: 2024-06-15

## 2024-06-14 RX ORDER — ONDANSETRON HYDROCHLORIDE 2 MG/ML
4 INJECTION, SOLUTION INTRAVENOUS EVERY 12 HOURS PRN
Status: DISCONTINUED | OUTPATIENT
Start: 2024-06-14 | End: 2024-06-14

## 2024-06-14 RX ORDER — SODIUM CHLORIDE 0.9 % (FLUSH) 0.9 %
3 SYRINGE (ML) INJECTION
OUTPATIENT
Start: 2024-06-14

## 2024-06-14 RX ORDER — LIDOCAINE HYDROCHLORIDE 20 MG/ML
INJECTION INTRAVENOUS
Status: DISCONTINUED | OUTPATIENT
Start: 2024-06-14 | End: 2024-06-14

## 2024-06-14 RX ORDER — AMOXICILLIN 250 MG
1 CAPSULE ORAL 2 TIMES DAILY
Status: DISCONTINUED | OUTPATIENT
Start: 2024-06-14 | End: 2024-06-15 | Stop reason: HOSPADM

## 2024-06-14 RX ORDER — FENTANYL CITRATE 50 UG/ML
INJECTION, SOLUTION INTRAMUSCULAR; INTRAVENOUS
Status: DISCONTINUED | OUTPATIENT
Start: 2024-06-14 | End: 2024-06-14

## 2024-06-14 RX ADMIN — ROCURONIUM BROMIDE 20 MG: 10 INJECTION INTRAVENOUS at 08:06

## 2024-06-14 RX ADMIN — ROCURONIUM BROMIDE 20 MG: 10 INJECTION INTRAVENOUS at 09:06

## 2024-06-14 RX ADMIN — ROCURONIUM BROMIDE 50 MG: 10 INJECTION INTRAVENOUS at 07:06

## 2024-06-14 RX ADMIN — FENTANYL CITRATE 100 MCG: 50 INJECTION, SOLUTION INTRAMUSCULAR; INTRAVENOUS at 07:06

## 2024-06-14 RX ADMIN — IODIXANOL 170 ML: 320 INJECTION, SOLUTION INTRAVASCULAR at 10:06

## 2024-06-14 RX ADMIN — HEPARIN SODIUM 4000 UNITS: 1000 INJECTION, SOLUTION INTRAVENOUS; SUBCUTANEOUS at 08:06

## 2024-06-14 RX ADMIN — PROPOFOL 150 MG: 10 INJECTION, EMULSION INTRAVENOUS at 07:06

## 2024-06-14 RX ADMIN — SUGAMMADEX 200 MG: 100 INJECTION, SOLUTION INTRAVENOUS at 10:06

## 2024-06-14 RX ADMIN — SODIUM CHLORIDE: 9 INJECTION, SOLUTION INTRAVENOUS at 12:06

## 2024-06-14 RX ADMIN — TICAGRELOR 60 MG: 60 TABLET ORAL at 08:06

## 2024-06-14 RX ADMIN — ONDANSETRON 4 MG: 2 INJECTION INTRAMUSCULAR; INTRAVENOUS at 10:06

## 2024-06-14 RX ADMIN — DOCUSATE SODIUM AND SENNOSIDES 1 TABLET: 8.6; 5 TABLET, FILM COATED ORAL at 08:06

## 2024-06-14 RX ADMIN — SODIUM CHLORIDE: 0.9 INJECTION, SOLUTION INTRAVENOUS at 07:06

## 2024-06-14 RX ADMIN — HEPARIN SODIUM 2000 UNITS: 1000 INJECTION, SOLUTION INTRAVENOUS; SUBCUTANEOUS at 08:06

## 2024-06-14 RX ADMIN — PROPOFOL 50 MG: 10 INJECTION, EMULSION INTRAVENOUS at 07:06

## 2024-06-14 RX ADMIN — MIDAZOLAM HYDROCHLORIDE 2 MG: 2 INJECTION, SOLUTION INTRAMUSCULAR; INTRAVENOUS at 07:06

## 2024-06-14 RX ADMIN — LIDOCAINE HYDROCHLORIDE 100 MG: 20 INJECTION INTRAVENOUS at 07:06

## 2024-06-14 NOTE — H&P
Neurosurgery History & Physical    Patient ID: Gilmar Spears is a 28 y.o. male.    No chief complaint on file.    Interval HPI 6/14/2024:  Since last visit patient has started taking Brilinta 60mg BID and ASA 324mg qDay.    His headaches remain the same.      He had an allergic reaction when he took plavix.  He has tolerated ASA and Brilinta well.    Interval HPI 5/15/2024:  Mr. Spears denies significant new symptoms since last visit.  He does continue to have left temporal headaches that happen more with straining.       HPI 3/14/2024:  Mr. Spears is a 28 year old gentleman with history of chronic headaches who more recently (for maybe the last 6 months) has had throbbing headaches on the left side of his head centered around his temple.  His last significant one was on Sunday (4 days ago).  Prior to that it had been a month prior.  Sometimes he feels like significant straining or laughing will bring on the headaches.       He denies weakness/numnbess/vision changes or other issues.     He has never seen a headaches specialist.     He does not smoke cigarettes but does vape.     He does not have high blood pressure.     His mother suffered a ruptured brain aneurysm two years ago. They are not aware of other family history of brain aneurysms.     Review of Systems  Denies F/C/N/V.  + HA    Past Medical History:   Diagnosis Date    Aneurysm      Social History     Socioeconomic History    Marital status: Single   Tobacco Use    Smoking status: Smoker, Current Status Unknown     Types: Vaping with nicotine    Smokeless tobacco: Never   Substance and Sexual Activity    Alcohol use: Yes     Comment: occassionally    Drug use: Never     Family History   Problem Relation Name Age of Onset    No Known Problems Mother      No Known Problems Father       Review of patient's allergies indicates:   Allergen Reactions    Amoxicillin Swelling    Iodinated contrast media Swelling    Plavix [clopidogrel] Hives       Current  "Outpatient Medications:     [START ON 6/15/2024] aspirin 325 MG tablet, Take 1 tablet (325 mg total) by mouth once daily., Disp: 30 tablet, Rfl: 5    diphenhydrAMINE (BENADRYL) 50 MG capsule, Take 50 mg by mouth once., Disp: , Rfl:     predniSONE (DELTASONE) 50 MG Tab, Take 1 tablet (50 mg total) by mouth as needed (Please take 13 hours, 7 hours, and 1 hour prior to procedure.)., Disp: 3 tablet, Rfl: 0    ticagrelor (BRILINTA) 60 mg tablet, Take 1 tablet (60 mg total) by mouth 2 (two) times daily. for 9 days, Disp: 18 tablet, Rfl: 0    doxycycline (MONODOX) 100 MG capsule, Take 100 mg by mouth every 12 (twelve) hours. (Patient not taking: Reported on 5/15/2024), Disp: , Rfl:     [START ON 6/15/2024] ticagrelor (BRILINTA) 60 mg tablet, Take 1 tablet (60 mg total) by mouth 2 (two) times daily., Disp: 60 tablet, Rfl: 5    Current Facility-Administered Medications:     0.9%  NaCl infusion, , Intravenous, Continuous, Sirisha Contreras PA-C    LIDOcaine (PF) 10 mg/ml (1%) injection 10 mg, 1 mL, Other, Once, Sirisha Contreras PA-C    Facility-Administered Medications Ordered in Other Encounters:     midazolam (VERSED) 1 mg/mL injection, , Intravenous, PRN, Mone Simmons, CRNA, 2 mg at 06/14/24 0735  Blood pressure 125/71, pulse 91, temperature 98.4 °F (36.9 °C), temperature source Temporal, resp. rate 16, height 5' 10.5" (1.791 m), weight 75.4 kg (166 lb 1.9 oz), SpO2 97%.      Neurologic Exam     Mental Status   Oriented to person, place, and time.   Attention: normal.   Speech: speech is normal   Level of consciousness: alert  Knowledge: good.     Cranial Nerves     CN III, IV, VI   Extraocular motions are normal.     CN VII   Facial expression full, symmetric.     Motor Exam   Muscle bulk: normal  Overall muscle tone: normal    Strength   Strength 5/5 throughout.     Sensory Exam   Light touch normal.       Physical Exam  Eyes:      Extraocular Movements: EOM normal.   Neurological:      Mental Status: He is " oriented to person, place, and time.      Motor: Motor strength is normal.  Psychiatric:         Speech: Speech normal.         Imaging:  No new imaging to review    Assessment/Plan:   Mr. Spears is a 28 year old gentleman with left ICA aneurysm with atypical headaches and family history of cerebral aneurysm rupture (mother).  After thorough discussion with the patient and family members he has elected to proceed.     Risks discussed include, but are not limited to, bleeding, pain, infection, groin hematoma, brain hemorrhage, aneurysm rupture, stroke, paralysis, coma, and death.  Discussed options of medical management and observation over time.    Patient and family understand pros/cons/risks/benefits and signed consents at bedside.

## 2024-06-14 NOTE — PLAN OF CARE
Procedure completed. Patient tolerated well; VSS. Hemostasis achieved to right groin via 6fr Angio-Seal at 1014; patient to remain flat until 1214. Site CDI without hematoma. Patient to be transported to Room 9067 accompanied by this RN and CRNA; report called to KIMBERLY Lugo.

## 2024-06-14 NOTE — HPI
Patient is a 28 y.o. male with no significant PMHx who presents for elective aneurysm treatment. Patient has had chronic headaches and recently has had throbbing headaches on the left side. He was seen by NSGY and diagnosed with left ICA aneurysm. 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 He was started on DAPT at home and today he underwent pipeline embolization of the left ICA aneurysm. Patient is admitted to [facility] for post op monitoring and higher level care.

## 2024-06-14 NOTE — ANESTHESIA POSTPROCEDURE EVALUATION
Anesthesia Post Evaluation    Patient: Gilmar Spears    Procedure(s) Performed: * No procedures listed *    Final Anesthesia Type: general      Patient location during evaluation: ICU  Patient participation: Yes- Able to Participate  Level of consciousness: awake and alert and oriented  Post-procedure vital signs: reviewed and stable  Pain management: adequate  Airway patency: patent    PONV status at discharge: No PONV  Anesthetic complications: no      Cardiovascular status: blood pressure returned to baseline  Respiratory status: unassisted, room air and spontaneous ventilation  Hydration status: euvolemic  Follow-up not needed.              Vitals Value Taken Time   /65 06/14/24 1100   Temp 35.9 °C (96.6 °F) 06/14/24 1100   Pulse 102 06/14/24 1100   Resp 16 06/14/24 1100   SpO2 100 % 06/14/24 1100         No case tracking events are documented in the log.      Pain/Jaciel Score: No data recorded

## 2024-06-14 NOTE — SUBJECTIVE & OBJECTIVE
Past Medical History:   Diagnosis Date    Aneurysm      History reviewed. No pertinent surgical history.   No current facility-administered medications on file prior to encounter.     Current Outpatient Medications on File Prior to Encounter   Medication Sig Dispense Refill    [START ON 6/15/2024] aspirin 325 MG tablet Take 1 tablet (325 mg total) by mouth once daily. 30 tablet 5    diphenhydrAMINE (BENADRYL) 50 MG capsule Take 50 mg by mouth once.      predniSONE (DELTASONE) 50 MG Tab Take 1 tablet (50 mg total) by mouth as needed (Please take 13 hours, 7 hours, and 1 hour prior to procedure.). 3 tablet 0    ticagrelor (BRILINTA) 60 mg tablet Take 1 tablet (60 mg total) by mouth 2 (two) times daily. for 9 days 18 tablet 0    doxycycline (MONODOX) 100 MG capsule Take 100 mg by mouth every 12 (twelve) hours. (Patient not taking: Reported on 5/15/2024)      [START ON 6/15/2024] ticagrelor (BRILINTA) 60 mg tablet Take 1 tablet (60 mg total) by mouth 2 (two) times daily. 60 tablet 5      Allergies: Amoxicillin, Iodinated contrast media, and Plavix [clopidogrel]  Family History   Problem Relation Name Age of Onset    No Known Problems Mother      No Known Problems Father       Social History     Tobacco Use    Smoking status: Smoker, Current Status Unknown     Types: Vaping with nicotine    Smokeless tobacco: Never   Substance Use Topics    Alcohol use: Yes     Comment: occassionally    Drug use: Never     Review of Systems   Constitutional: Negative.    HENT: Negative.     Respiratory: Negative.     Gastrointestinal: Negative.    Genitourinary: Negative.    Neurological: Negative.    Psychiatric/Behavioral: Negative.       Objective:     Vitals:    Temp: 96.6 °F (35.9 °C)  Pulse: 95  Rhythm: normal sinus rhythm  BP: 123/65  MAP (mmHg): 82  Resp: 14  SpO2: 98 %    Temp  Min: 96.6 °F (35.9 °C)  Max: 98.4 °F (36.9 °C)  Pulse  Min: 80  Max: 102  BP  Min: 119/60  Max: 136/65  MAP (mmHg)  Min: 82  Max: 99  Resp  Min: 14   Max: 20  SpO2  Min: 96 %  Max: 100 %    No intake/output data recorded.            Physical Exam  Physical Exam    Constitutional: Well-nourished, well-developed. Appears stated age.   No obvious distress.   Patient sitting in bed comfortably    Eyes: Clear conjunctiva. Anicteric. No discharge.   Lids without lesions.    HEENT:   Nose and external ears atraumatic.   Mucus membranes are moist.     Cardio: Regular rate and rhythm on telemetry monitor.    Respiratory: Regular effort, no respiratory distress, on RA.    GI: Soft, non-distended, non-tender.    Skin: R groin site soft with no drainage and dressing intact    Neuro: Sedation: None     E4 V5 M6    Awake, alert, and oriented to self, time, place, and situation.   Patient is answering all questions appropriately and following all commands briskly.   No facial droop. EOMI. PERRL.     Motor:   No pronator drift  JACOB spontaneously and against gravity   RUE: 5/5  LUE: 5/5  RLE: 5/5  LLE: 5/5    Sensory:  Sensation grossly intact and symmetric    Gait deferred.          Today I personally reviewed pertinent medications, lines/drains/airways, imaging, cardiology results, laboratory results,

## 2024-06-14 NOTE — PROGRESS NOTES
Patient arrived to [facility] from IR s/p cerebral angio pipeline embolization of left ICA aneurysm    Report received from:  Belen    Type of stroke/diagnosis: ICA Aneurysm s/p pipeline embolization through right groin. Leg immobilizer in place    Thrombectomy start and end time (if applicable)    Current symptoms:     Skin Assessment done: Y  Wounds noted: None  *If wounds noted, was Wound Care consulted? N/A  *If wounds noted, LDA placed? N/A  Skin Assessment Verified by:  KIMBERLY Raza Completed? Will complete at later time. To be flat until 1214    Patient Belongings on Admit: (be specific) None    NCC notified: ARNALDO Mcintosh at bedside

## 2024-06-14 NOTE — ANESTHESIA PREPROCEDURE EVALUATION
06/14/2024  Gilmar Spears is a 28 y.o., male.      Pre-op Assessment    I have reviewed the Patient Summary Reports.     I have reviewed the Nursing Notes. I have reviewed the NPO Status.   I have reviewed the Medications.     Review of Systems  Anesthesia Hx:  No problems with previous Anesthesia   History of prior surgery of interest to airway management or planning:          Denies Family Hx of Anesthesia complications.    Denies Personal Hx of Anesthesia complications.                    Social:  Smoker       Hematology/Oncology:  Hematology Normal   Oncology Normal                                   EENT/Dental:  EENT/Dental Normal           Cardiovascular:  Cardiovascular Normal Exercise tolerance: good                                           Pulmonary:  Pulmonary Normal                       Renal/:  Renal/ Normal                 Hepatic/GI:  Hepatic/GI Normal                 Musculoskeletal:  Musculoskeletal Normal                Neurological:      Headaches                                 Endocrine:  Endocrine Normal            Dermatological:  Skin Normal    Psych:  Psychiatric Normal                    Physical Exam  General: Well nourished, Cooperative, Alert and Oriented    Airway:  Mallampati: II   Mouth Opening: Normal  TM Distance: Normal  Tongue: Normal  Neck ROM: Normal ROM    Dental:  Intact        Anesthesia Plan  Type of Anesthesia, risks & benefits discussed:    Anesthesia Type: Gen ETT, MAC  Intra-op Monitoring Plan: Standard ASA Monitors  Post Op Pain Control Plan: multimodal analgesia and IV/PO Opioids PRN  Induction:  IV  Airway Plan: Direct, Post-Induction  Informed Consent: Informed consent signed with the Patient and all parties understand the risks and agree with anesthesia plan.  All questions answered.   ASA Score: 2  Day of Surgery Review of History & Physical: H&P Update  referred to the surgeon/provider.    Ready For Surgery From Anesthesia Perspective.     .

## 2024-06-14 NOTE — BRIEF OP NOTE
Neurointerventional Radiology Post-Procedure Note    Pre Op Diagnosis: Left communicating segment ICA aneurysm    Post Op Diagnosis: Same    Procedure: Cerebral angiogram    Procedure performed by: Gilbert Cardona MD    Written Informed Consent Obtained: Yes    Specimen Removed: NO    Estimated Blood Loss: less than 100     Procedure report:     Left angiogram revealed communicating segment aneurysm    Successful pipeline embolization performed of left ICA aneurysm    Slight contrast stasis on post embolization runs.    The sheath removed and hemostasis achieved using 6 Danish Angioseal.  No hematoma was present at the time of hemostasis.    The patient tolerated the procedure well.

## 2024-06-14 NOTE — DISCHARGE INSTRUCTIONS
For the first 24 hours:  Dont drive  Avoid walking bending, lifting, and taking stairs  Avoid lifting anything over 10 pounds  Be sure to follow any other instructions from your doctor      Call your doctor if you have any of the following:  Severe headache, visual problems, new weakness, dizziness, or trouble speaking - these are signs of stroke, call 911  Fever of 100.4 (38C) or higher lasting longer than 24 hours  Bleeding, swelling, or a large lump at the insertion site  Sharp or increasing pain at the insertion site  Leg pain, numbness, or a cold leg or foot  Any other symptoms your provider instructed you to report based on your medical condition.    Contact information:    For immediate concerns that are not emergent, you may call our interventional radiology clinic at [phone] or [phone]    After hours and weekends: Call the paging  at [phone] and ask for the Radiology Resident on call

## 2024-06-14 NOTE — PLAN OF CARE
Pt arrived to IR room 4 for pipeline embolization with anesthesia. Pt oriented to unit and staff, Pt safely transferred from stretcher to procedural table. Fall risk reviewed and comfort measures utilized with interventions. Safety strap applied, position pillows to minimize pressure points. Blankets applied. Pt prepped and draped utilizing standard sterile technique. Patient placed on continuous monitoring, as required by sedation policy. Timeouts implemented utilizing standard universal time-out per department and facility policy. CRNA to remain at bedside with continuous monitoring. Pt resting comfortably. Denies pain/discomfort. Will continue to monitor. See anesthesia flow sheets for monitoring, medication administration, and updates. patient verbalizes understanding.

## 2024-06-14 NOTE — H&P
[facility]  Neurocritical Care  History & Physical    Admit Date: 6/14/2024  Service Date: 06/14/2024  Length of Stay: 0    Subjective:     Chief Complaint: 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    History of Present Illness: Patient is a 28 y.o. male with no significant PMHx who presents for elective aneurysm treatment. Patient has had chronic headaches and recently has had throbbing headaches on the left side. He was seen by NSGY and diagnosed with left ICA aneurysm. 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 He was started on DAPT at home and today he underwent pipeline embolization of the left ICA aneurysm. Patient is admitted to [facility] for post op monitoring and higher level care.       Past Medical History:   Diagnosis Date    Aneurysm      History reviewed. No pertinent surgical history.   No current facility-administered medications on file prior to encounter.     Current Outpatient Medications on File Prior to Encounter   Medication Sig Dispense Refill    [START ON 6/15/2024] aspirin 325 MG tablet Take 1 tablet (325 mg total) by mouth once daily. 30 tablet 5    diphenhydrAMINE (BENADRYL) 50 MG capsule Take 50 mg by mouth once.      predniSONE (DELTASONE) 50 MG Tab Take 1 tablet (50 mg total) by mouth as needed (Please take 13 hours, 7 hours, and 1 hour prior to procedure.). 3 tablet 0    ticagrelor (BRILINTA) 60 mg tablet Take 1 tablet (60 mg total) by mouth 2 (two) times daily. for 9 days 18 tablet 0    doxycycline (MONODOX) 100 MG capsule Take 100 mg by mouth every 12 (twelve) hours. (Patient not taking: Reported on 5/15/2024)      [START ON 6/15/2024] ticagrelor (BRILINTA) 60 mg tablet Take 1 tablet (60 mg total) by mouth 2 (two) times daily. 60 tablet 5      Allergies: Amoxicillin, Iodinated contrast media, and Plavix [clopidogrel]  Family History   Problem Relation Name Age of Onset    No Known  Problems Mother      No Known Problems Father       Social History     Tobacco Use    Smoking status: Smoker, Current Status Unknown     Types: Vaping with nicotine    Smokeless tobacco: Never   Substance Use Topics    Alcohol use: Yes     Comment: occassionally    Drug use: Never     Review of Systems   Constitutional: Negative.    HENT: Negative.     Respiratory: Negative.     Gastrointestinal: Negative.    Genitourinary: Negative.    Neurological: Negative.    Psychiatric/Behavioral: Negative.       Objective:     Vitals:    Temp: 96.6 °F (35.9 °C)  Pulse: 95  Rhythm: normal sinus rhythm  BP: 123/65  MAP (mmHg): 82  Resp: 14  SpO2: 98 %    Temp  Min: 96.6 °F (35.9 °C)  Max: 98.4 °F (36.9 °C)  Pulse  Min: 80  Max: 102  BP  Min: 119/60  Max: 136/65  MAP (mmHg)  Min: 82  Max: 99  Resp  Min: 14  Max: 20  SpO2  Min: 96 %  Max: 100 %    No intake/output data recorded.            Physical Exam  Physical Exam    Constitutional: Well-nourished, well-developed. Appears stated age.   No obvious distress.   Patient sitting in bed comfortably    Eyes: Clear conjunctiva. Anicteric. No discharge.   Lids without lesions.    HEENT:   Nose and external ears atraumatic.   Mucus membranes are moist.     Cardio: Regular rate and rhythm on telemetry monitor.    Respiratory: Regular effort, no respiratory distress, on RA.    GI: Soft, non-distended, non-tender.    Skin: R groin site soft with no drainage and dressing intact    Neuro: Sedation: None     E4 V5 M6    Awake, alert, and oriented to self, time, place, and situation.   Patient is answering all questions appropriately and following all commands briskly.   No facial droop. EOMI. PERRL.     Motor:   No pronator drift  JACOB spontaneously and against gravity   RUE: 5/5  LUE: 5/5  RLE: 5/5  LLE: 5/5    Sensory:  Sensation grossly intact and symmetric    Gait deferred.          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  S/P pipeline embolization 6/14  -admit to NCC   -neuro IR following   -continue DAPT with ASA and brillinta (allergy to plavix)  -iodine allergy pre-treated before angiogram   -Q 1 hour neuro checks and vitals   -SBP goal <140            The patient is being Prophylaxed for:  Venous Thromboembolism with: Mechanical  Stress Ulcer with: Not Applicable   Ventilator Pneumonia with: not applicable    Activity Orders            Progressive Mobility Protocol (mobilize patient to their highest level of functioning at least twice daily) starting at 06/14 2000    Progressive Mobility Protocol (mobilize patient to their highest level of functioning at least twice daily) starting at 06/14 2000    Diet Adult Regular (IDDSI Level 7): Regular starting at 06/14 1212    Elevate HOB Collagen closure or PERCLOSE plug/device - Elevate HOB 30 degrees with limb immobilized for 2 hours after procedure. starting at 06/14 1041          Full Code    Dayna Duncan PA-C  Neurocritical Care  [facility]

## 2024-06-14 NOTE — ASSESSMENT & PLAN NOTE
S/P pipeline embolization 6/14  -admit to NCC   -neuro IR following   -continue DAPT with ASA and brillinta (allergy to plavix)  -iodine allergy pre-treated before angiogram   -Q 1 hour neuro checks and vitals   -SBP goal <140

## 2024-06-14 NOTE — TRANSFER OF CARE
"Anesthesia Transfer of Care Note    Patient: Gilmar Spears    Procedure(s) Performed: * No procedures listed *    Patient location: ICU    Anesthesia Type: general    Transport from OR: Transported from OR on 6-10 L/min O2 by face mask with adequate spontaneous ventilation. Continuous ECG monitoring in transport. Continuous SpO2 monitoring in transport    Post pain: adequate analgesia    Post assessment: no apparent anesthetic complications    Post vital signs: stable    Level of consciousness: sedated    Nausea/Vomiting: no nausea/vomiting    Complications: none    Transfer of care protocol was followed      Last vitals: Visit Vitals  /71 (BP Location: Right arm, Patient Position: Lying)   Pulse 91   Temp 36.9 °C (98.4 °F) (Temporal)   Resp 16   Ht 5' 10.5" (1.791 m)   Wt 75.4 kg (166 lb 1.9 oz)   SpO2 97%   BMI 23.50 kg/m²     "

## 2024-06-14 NOTE — ANESTHESIA PROCEDURE NOTES
Intubation    Date/Time: 6/14/2024 7:55 AM    Performed by: Mone Simmons CRNA  Authorized by: Rohit Ryan MD    Intubation:     Induction:  Intravenous    Intubated:  Postinduction    Mask Ventilation:  Easy mask    Attempts:  1    Attempted By:  Staff anesthesiologist    Method of Intubation:  Video laryngoscopy    Blade:  Glidescope 3    Laryngeal View Grade: Grade I - full view of cords      Difficult Airway Encountered?: No      Complications:  None    Airway Device:  Oral endotracheal tube    Airway Device Size:  7.0    Style/Cuff Inflation:  Cuffed    Inflation Amount (mL):  6    Tube secured:  22    Secured at:  The lips    Placement Verified By:  Capnometry    Complicating Factors:  None    Findings Post-Intubation:  BS equal bilateral and atraumatic/condition of teeth unchanged

## 2024-06-15 VITALS
HEIGHT: 71 IN | OXYGEN SATURATION: 98 % | DIASTOLIC BLOOD PRESSURE: 67 MMHG | SYSTOLIC BLOOD PRESSURE: 120 MMHG | HEART RATE: 93 BPM | BODY MASS INDEX: 23.26 KG/M2 | RESPIRATION RATE: 13 BRPM | WEIGHT: 166.13 LBS | TEMPERATURE: 99 F

## 2024-06-15 LAB
ALBUMIN SERPL BCP-MCNC: 3.5 G/DL (ref 3.5–5.2)
ALP SERPL-CCNC: 55 U/L (ref 55–135)
ALT SERPL W/O P-5'-P-CCNC: 9 U/L (ref 10–44)
ANION GAP SERPL CALC-SCNC: 7 MMOL/L (ref 8–16)
AST SERPL-CCNC: 12 U/L (ref 10–40)
BASOPHILS # BLD AUTO: 0.03 K/UL (ref 0–0.2)
BASOPHILS NFR BLD: 0.2 % (ref 0–1.9)
BILIRUB SERPL-MCNC: 0.2 MG/DL (ref 0.1–1)
BUN SERPL-MCNC: 11 MG/DL (ref 6–20)
CALCIUM SERPL-MCNC: 8.3 MG/DL (ref 8.7–10.5)
CHLORIDE SERPL-SCNC: 109 MMOL/L (ref 95–110)
CO2 SERPL-SCNC: 23 MMOL/L (ref 23–29)
CREAT SERPL-MCNC: 0.7 MG/DL (ref 0.5–1.4)
DIFFERENTIAL METHOD BLD: ABNORMAL
EOSINOPHIL # BLD AUTO: 0 K/UL (ref 0–0.5)
EOSINOPHIL NFR BLD: 0.2 % (ref 0–8)
ERYTHROCYTE [DISTWIDTH] IN BLOOD BY AUTOMATED COUNT: 12.4 % (ref 11.5–14.5)
EST. GFR  (NO RACE VARIABLE): >60 ML/MIN/1.73 M^2
GLUCOSE SERPL-MCNC: 164 MG/DL (ref 70–110)
HCT VFR BLD AUTO: 38.8 % (ref 40–54)
HGB BLD-MCNC: 13.2 G/DL (ref 14–18)
IMM GRANULOCYTES # BLD AUTO: 0.1 K/UL (ref 0–0.04)
IMM GRANULOCYTES NFR BLD AUTO: 0.5 % (ref 0–0.5)
LYMPHOCYTES # BLD AUTO: 1.5 K/UL (ref 1–4.8)
LYMPHOCYTES NFR BLD: 7.9 % (ref 18–48)
MAGNESIUM SERPL-MCNC: 1.9 MG/DL (ref 1.6–2.6)
MCH RBC QN AUTO: 31.9 PG (ref 27–31)
MCHC RBC AUTO-ENTMCNC: 34 G/DL (ref 32–36)
MCV RBC AUTO: 94 FL (ref 82–98)
MONOCYTES # BLD AUTO: 0.9 K/UL (ref 0.3–1)
MONOCYTES NFR BLD: 4.8 % (ref 4–15)
NEUTROPHILS # BLD AUTO: 16 K/UL (ref 1.8–7.7)
NEUTROPHILS NFR BLD: 86.4 % (ref 38–73)
NRBC BLD-RTO: 0 /100 WBC
PHOSPHATE SERPL-MCNC: 3.1 MG/DL (ref 2.7–4.5)
PLATELET # BLD AUTO: 188 K/UL (ref 150–450)
PMV BLD AUTO: 11.7 FL (ref 9.2–12.9)
POTASSIUM SERPL-SCNC: 3.5 MMOL/L (ref 3.5–5.1)
PROT SERPL-MCNC: 5.9 G/DL (ref 6–8.4)
RBC # BLD AUTO: 4.14 M/UL (ref 4.6–6.2)
SODIUM SERPL-SCNC: 139 MMOL/L (ref 136–145)
WBC # BLD AUTO: 18.47 K/UL (ref 3.9–12.7)

## 2024-06-15 PROCEDURE — 80053 COMPREHEN METABOLIC PANEL: CPT | Performed by: PHYSICIAN ASSISTANT

## 2024-06-15 PROCEDURE — 25000003 PHARM REV CODE 250

## 2024-06-15 PROCEDURE — 25000003 PHARM REV CODE 250: Performed by: PHYSICIAN ASSISTANT

## 2024-06-15 PROCEDURE — 84100 ASSAY OF PHOSPHORUS: CPT | Performed by: PHYSICIAN ASSISTANT

## 2024-06-15 PROCEDURE — 99233 SBSQ HOSP IP/OBS HIGH 50: CPT | Mod: ,,, | Performed by: PSYCHIATRY & NEUROLOGY

## 2024-06-15 PROCEDURE — 83735 ASSAY OF MAGNESIUM: CPT | Performed by: PHYSICIAN ASSISTANT

## 2024-06-15 PROCEDURE — 25000242 PHARM REV CODE 250 ALT 637 W/ HCPCS: Performed by: PHYSICIAN ASSISTANT

## 2024-06-15 PROCEDURE — 85025 COMPLETE CBC W/AUTO DIFF WBC: CPT | Performed by: PHYSICIAN ASSISTANT

## 2024-06-15 PROCEDURE — 94761 N-INVAS EAR/PLS OXIMETRY MLT: CPT

## 2024-06-15 RX ORDER — OXYCODONE HYDROCHLORIDE 5 MG/1
5 TABLET ORAL EVERY 6 HOURS PRN
Qty: 10 TABLET | Refills: 0 | Status: SHIPPED | OUTPATIENT
Start: 2024-06-15 | End: 2024-06-18 | Stop reason: ALTCHOICE

## 2024-06-15 RX ORDER — ONDANSETRON 8 MG/1
8 TABLET, ORALLY DISINTEGRATING ORAL EVERY 6 HOURS PRN
Qty: 28 TABLET | Refills: 0 | Status: SHIPPED | OUTPATIENT
Start: 2024-06-15 | End: 2024-06-22

## 2024-06-15 RX ORDER — OXYCODONE HYDROCHLORIDE 5 MG/1
5 TABLET ORAL EVERY 6 HOURS PRN
Qty: 10 TABLET | Refills: 0 | Status: SHIPPED | OUTPATIENT
Start: 2024-06-15 | End: 2024-06-15

## 2024-06-15 RX ORDER — ACETAMINOPHEN 325 MG/1
650 TABLET ORAL EVERY 4 HOURS PRN
Start: 2024-06-15

## 2024-06-15 RX ORDER — ONDANSETRON 8 MG/1
8 TABLET, ORALLY DISINTEGRATING ORAL EVERY 6 HOURS PRN
Status: DISCONTINUED | OUTPATIENT
Start: 2024-06-15 | End: 2024-06-15 | Stop reason: HOSPADM

## 2024-06-15 RX ORDER — OXYCODONE HYDROCHLORIDE 5 MG/1
5 TABLET ORAL EVERY 6 HOURS PRN
Status: DISCONTINUED | OUTPATIENT
Start: 2024-06-15 | End: 2024-06-15 | Stop reason: HOSPADM

## 2024-06-15 RX ORDER — ONDANSETRON 4 MG/1
8 TABLET, FILM COATED ORAL EVERY 6 HOURS PRN
Status: DISCONTINUED | OUTPATIENT
Start: 2024-06-15 | End: 2024-06-15

## 2024-06-15 RX ORDER — POTASSIUM CHLORIDE 20 MEQ/1
40 TABLET, EXTENDED RELEASE ORAL ONCE
Status: COMPLETED | OUTPATIENT
Start: 2024-06-15 | End: 2024-06-15

## 2024-06-15 RX ADMIN — OXYCODONE 5 MG: 5 TABLET ORAL at 09:06

## 2024-06-15 RX ADMIN — TICAGRELOR 60 MG: 60 TABLET ORAL at 09:06

## 2024-06-15 RX ADMIN — POTASSIUM CHLORIDE 40 MEQ: 1500 TABLET, EXTENDED RELEASE ORAL at 01:06

## 2024-06-15 RX ADMIN — DOCUSATE SODIUM AND SENNOSIDES 1 TABLET: 8.6; 5 TABLET, FILM COATED ORAL at 09:06

## 2024-06-15 RX ADMIN — ONDANSETRON 8 MG: 8 TABLET, ORALLY DISINTEGRATING ORAL at 09:06

## 2024-06-15 NOTE — PLAN OF CARE
Patient AAOX3, independent at baseline. Denies owning any DME. Family to provide transportation home. PCP updated in system/   06/15/24 9364   Discharge Assessment   Assessment Type Discharge Planning Assessment   Confirmed/corrected address, phone number and insurance Yes   Confirmed Demographics Correct on Facesheet   Source of Information patient   Communicated EVELINA with patient/caregiver Date not available/Unable to determine   People in Home spouse   Do you expect to return to your current living situation? Yes   Do you have help at home or someone to help you manage your care at home? Yes   Who are your caregiver(s) and their phone number(s)? Baldomero Medina (Significant other)  [phone] (Mobile)   Prior to hospitilization cognitive status: Alert/Oriented   Current cognitive status: Alert/Oriented   Walking or Climbing Stairs Difficulty no   Dressing/Bathing Difficulty no   Equipment Currently Used at Home none   Readmission within 30 days? No   Do you currently have service(s) that help you manage your care at home? No   Do you take prescription medications? Yes   Do you have prescription coverage? Yes   Do you have any problems affording any of your prescribed medications? No   Is the patient taking medications as prescribed? yes   Who is going to help you get home at discharge? Baldomero Medina (Significant other)  [phone] (Mobile)   How do you get to doctors appointments? car, drives self   Are you on dialysis? No   Do you take coumadin? No   Discharge Plan A Home with family   Discharge Plan B Home   DME Needed Upon Discharge  none   Discharge Plan discussed with: Patient   Transition of Care Barriers None   Physical Activity   On average, how many days per week do you engage in moderate to strenuous exercise (like a brisk walk)? 6 days   On average, how many minutes do you engage in exercise at this level? 40 min   Financial Resource Strain   How hard is it for you to pay for the very basics like  food, housing, medical care, and heating? Not hard   Housing Stability   In the last 12 months, was there a time when you were not able to pay the mortgage or rent on time? N   At any time in the past 12 months, were you homeless or living in a shelter (including now)? N   Transportation Needs   Has the lack of transportation kept you from medical appointments, meetings, work or from getting things needed for daily living? No   Food Insecurity   Within the past 12 months, you worried that your food would run out before you got the money to buy more. Never true   Within the past 12 months, the food you bought just didn't last and you didn't have money to get more. Never true   Stress   Do you feel stress - tense, restless, nervous, or anxious, or unable to sleep at night because your mind is troubled all the time - these days? Very much   Social Isolation   How often do you feel lonely or isolated from those around you?  Never   Alcohol Use   Q1: How often do you have a drink containing alcohol? Monthly or l   Q2: How many drinks containing alcohol do you have on a typical day when you are drinking? 1 or 2   Q3: How often do you have six or more drinks on one occasion? Never   Utilities   In the past 12 months has the electric, gas, oil, or water company threatened to shut off services in your home? No   Health Literacy   How often do you need to have someone help you when you read instructions, pamphlets, or other written material from your doctor or pharmacy? 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]    Admission Date: 6/14/2024  Expected Discharge Date:     Transition of Care Barriers: (P) None    Payor: BLUE [employer]OSS [employer]UE [employer]IELD / Plan: [employer] OF [employer]FF [employer]DR[employer] [facility]  / Product Type: Commercial /     Extended Emergency Contact Information  Primary Emergency Contact: AdamBaldomero  Address: [address] [place], [place] [id number] [place] [address]  Mobile Phone: [phone]  Relation: Significant other  Secondary Emergency Contact: Alexia Spann   [facility]  Home Phone: [phone]  Relation: Mother    Discharge Plan A: (P) Home with family  Discharge Plan B: (P) Home      [facility] [address] [place], [place] - [address]  [address] [facility] [address]  Phone: [phone] Fax: [phone]      Initial Assessment (most recent)       Adult Discharge Assessment - 06/15/24 0946          Discharge Assessment    Assessment Type Discharge Planning Assessment (P)      Confirmed/corrected address, phone number and insurance Yes (P)      Confirmed Demographics Correct on Facesheet (P)      Source of Information patient (P)      Communicated EVELINA with patient/caregiver Date not available/Unable to determine (P)      People in Home spouse (P)      Do you expect to return to your current living situation? Yes (P)      Do you have help at home or someone to help you manage your care at home? Yes (P)      Who are your caregiver(s) and their phone number(s)? AdamBaldomero luevano (Significant other)  [phone] (Mobile) (P)      Prior to hospitilization cognitive status: Alert/Oriented (P)      Current cognitive status: Alert/Oriented (P)      Walking or Climbing Stairs Difficulty no (P)      Dressing/Bathing Difficulty no (P)      Equipment Currently Used at Home none (P)      Readmission within 30 days? No (P)      Do you currently have service(s) that help you manage your care at home? No (P)      Do you take prescription medications? Yes (P)      Do you have prescription coverage? Yes (P)      Do you have any problems affording any of your prescribed medications? No (P)      Is the patient taking medications as prescribed? yes (P)      Who is going  to help you get home at discharge? Baldomero Medina (Significant other)  [phone] (Mobile) (P)      How do you get to doctors appointments? car, drives self (P)      Are you on dialysis? No (P)      Do you take coumadin? No (P)      Discharge Plan A Home with family (P)      Discharge Plan B Home (P)      DME Needed Upon Discharge  none (P)      Discharge Plan discussed with: Patient (P)      Transition of Care Barriers None (P)         Physical Activity    On average, how many days per week do you engage in moderate to strenuous exercise (like a brisk walk)? 6 days (P)      On average, how many minutes do you engage in exercise at this level? 40 min (P)         Financial Resource Strain    How hard is it for you to pay for the very basics like food, housing, medical care, and heating? Not hard at all (P)         Housing Stability    In the last 12 months, was there a time when you were not able to pay the mortgage or rent on time? No (P)      At any time in the past 12 months, were you homeless or living in a shelter (including now)? No (P)         Transportation Needs    Has the lack of transportation kept you from medical appointments, meetings, work or from getting things needed for daily living? No (P)         Food Insecurity    Within the past 12 months, you worried that your food would run out before you got the money to buy more. Never true (P)      Within the past 12 months, the food you bought just didn't last and you didn't have money to get more. Never true (P)         Stress    Do you feel stress - tense, restless, nervous, or anxious, or unable to sleep at night because your mind is troubled all the time - these days? Very much (P)         Social Isolation    How often do you feel lonely or isolated from those around you?  Never (P)         Alcohol Use    Q1: How often do you have a drink containing alcohol? Monthly or less (P)      Q2: How many drinks containing alcohol do you have on a typical day  when you are drinking? 1 or 2 (P)      Q3: How often do you have six or more drinks on one occasion? Never (P)         Utilities    In the past 12 months has the electric, gas, oil, or water company threatened to shut off services in your home? No (P)         Health Literacy    How often do you need to have someone help you when you read instructions, pamphlets, or other written material from your doctor or pharmacy? Sometimes (P)         OTHER    Name(s) of People in Home Baldomero Medina (Significant other)  [phone] (Mobile) (P)

## 2024-06-15 NOTE — SUBJECTIVE & OBJECTIVE
Interval History:  NAEON.     Review of Systems   Neurological:  Positive for headaches.       Objective:     Vitals:  Temp: 97.4 °F (36.3 °C)  Pulse: 90  Rhythm: normal sinus rhythm  BP: (!) 107/56  MAP (mmHg): 73  Resp: (!) 22  SpO2: 97 %    Temp  Min: 96.6 °F (35.9 °C)  Max: 98.2 °F (36.8 °C)  Pulse  Min: 70  Max: 116  BP  Min: 93/23  Max: 136/65  MAP (mmHg)  Min: 66  Max: 99  Resp  Min: 12  Max: 22  SpO2  Min: 96 %  Max: 100 %    06/14 0701 - 06/15 0700  In: 1490.4 [P.O.:560; I.V.:330.4]  Out: 520 [Urine:520]   Unmeasured Output  Urine Occurrence: 1  Stool Occurrence: 0  Emesis Occurrence: 0  Pad Count: 0        Physical Exam  Constitutional: Well-nourished, well-developed. Appears stated age.   No obvious distress.   Patient sitting in bed comfortably  Eyes: Clear conjunctiva. Anicteric. No discharge.   Lids without lesions.  HEENT:   Nose and external ears atraumatic.   Cardio: Regular rate and rhythm on telemetry monitor.  Respiratory: Regular effort, no respiratory distress, on RA.  GI: Soft, non-distended, non-tender.  Skin: R groin site soft with no drainage and dressing intact  Neuro: Sedation: None   E4 V5 M6  Awake, alert, and oriented to self, time, place, and situation.   Patient is answering all questions appropriately and following all commands briskly.   No facial droop. EOMI. PERRL.   No pronator drift  JACOB spontaneously and against gravity   RUE: 5/5  LUE: 5/5  RLE: 5/5  LLE: 5/5  Sensation grossly intact and symmetric            Medications:  Continuous Scheduled[START ON 6/16/2024] aspirin, 325 mg, Daily  LIDOcaine (PF) 10 mg/ml (1%), 1 mL, Once  senna-docusate 8.6-50 mg, 1 tablet, BID  ticagrelor, 60 mg, BID    PRNacetaminophen, 650 mg, Q4H PRN  ondansetron, 4 mg, Q8H PRN  sodium chloride 0.9%, 10 mL, PRN      Today I personally reviewed pertinent medications, lines/drains/airways, imaging, cardiology results, laboratory results, microbiology results, notably:         Diet  Diet Adult Regular  (IDDSI Level 7)  Diet Adult Regular (IDDSI Level 7)

## 2024-06-15 NOTE — NURSING
Discharge instructions given to patient and family member, went over follow-up appointment, and medications. Patient verbalized understanding of instructions. Patient personal belongings gathered. IV's removed.

## 2024-06-15 NOTE — PROGRESS NOTES
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 male with no significant PMHx who presents for elective aneurysm treatment. Patient has had chronic headaches and recently has had throbbing headaches on the left side. He was seen by NSGY and diagnosed with left ICA aneurysm. 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 He was started on DAPT at home and today he underwent pipeline embolization of the left ICA aneurysm. Patient is admitted to [facility] for post op monitoring and higher level care.     Hospital Course: 06/15/2024 Pending discharge today.      Interval History:  NAEON.     Review of Systems   Neurological:  Positive for headaches.       Objective:     Vitals:  Temp: 97.4 °F (36.3 °C)  Pulse: 90  Rhythm: normal sinus rhythm  BP: (!) 107/56  MAP (mmHg): 73  Resp: (!) 22  SpO2: 97 %    Temp  Min: 96.6 °F (35.9 °C)  Max: 98.2 °F (36.8 °C)  Pulse  Min: 70  Max: 116  BP  Min: 93/23  Max: 136/65  MAP (mmHg)  Min: 66  Max: 99  Resp  Min: 12  Max: 22  SpO2  Min: 96 %  Max: 100 %    06/14 0701 - 06/15 0700  In: 1490.4 [P.O.:560; I.V.:330.4]  Out: 520 [Urine:520]   Unmeasured Output  Urine Occurrence: 1  Stool Occurrence: 0  Emesis Occurrence: 0  Pad Count: 0        Physical Exam  Constitutional: Well-nourished, well-developed. Appears stated age.   No obvious distress.   Patient sitting in bed comfortably  Eyes: Clear conjunctiva. Anicteric. No discharge.   Lids without lesions.  HEENT:   Nose and external ears atraumatic.   Cardio: Regular rate and rhythm on telemetry monitor.  Respiratory: Regular effort, no respiratory distress, on RA.  GI: Soft, non-distended, non-tender.  Skin: R groin site soft with no drainage and  dressing intact  Neuro: Sedation: None   E4 V5 M6  Awake, alert, and oriented to self, time, place, and situation.   Patient is answering all questions appropriately and following all commands briskly.   No facial droop. EOMI. PERRL.   No pronator drift  JACOB spontaneously and against gravity   RUE: 5/5  LUE: 5/5  RLE: 5/5  LLE: 5/5  Sensation grossly intact and symmetric            Medications:  Continuous Scheduled[START ON 6/16/2024] aspirin, 325 mg, Daily  LIDOcaine (PF) 10 mg/ml (1%), 1 mL, Once  senna-docusate 8.6-50 mg, 1 tablet, BID  ticagrelor, 60 mg, BID    PRNacetaminophen, 650 mg, Q4H PRN  ondansetron, 4 mg, Q8H PRN  sodium chloride 0.9%, 10 mL, PRN      Today I personally reviewed pertinent medications, lines/drains/airways, imaging, cardiology results, laboratory results, microbiology results, notably:         Diet  Diet Adult Regular (IDDSI Level 7)  Diet Adult Regular (IDDSI Level 7)        Assessment/Plan:     Neuro  * 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  S/P pipeline embolization 6/14  -admit to [facility]   -neuro IR following   -continue DAPT with ASA and brillinta (allergy to plavix)  -iodine allergy pre-treated before angiogram   -Q 1 hour neuro checks and vitals   -SBP goal <140            The patient is being Prophylaxed for:  Venous Thromboembolism with: Mechanical  Stress Ulcer with: None  Ventilator Pneumonia with: none    Activity Orders            Progressive Mobility Protocol (mobilize patient to their highest level of functioning at least twice daily) starting at 06/14 2000    Progressive Mobility Protocol (mobilize patient to their highest level of functioning at least twice daily) starting at 06/14 2000    Diet Adult Regular (IDDSI Level 7): Regular starting at 06/14 1212    Elevate HOB Collagen closure or PERCLOSE plug/device - Elevate HOB 30 degrees with limb immobilized for 2 hours after procedure. starting at 06/14 1041          Full Code    Sommer Hatfield,  NELI  Neurocritical Care  [facility]

## 2024-06-15 NOTE — DISCHARGE SUMMARY
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 male with no significant PMHx who presents for elective aneurysm treatment. Patient has had chronic headaches and recently has had throbbing headaches on the left side. He was seen by NSGY and diagnosed with left ICA aneurysm. 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 He was started on DAPT at home and today he underwent pipeline embolization of the left ICA aneurysm. Patient is admitted to [facility] for post op monitoring and higher level care.     Hospital Course by Event: 06/15/2024 Pending discharge today.      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  S/P pipeline embolization 6/14  -admit to [facility]   -neuro IR following   -continue DAPT with ASA and brillinta (allergy to plavix)  -iodine allergy pre-treated before angiogram   -Q 1 hour neuro checks and vitals   -SBP goal <140          Goals of Care Treatment Preferences:  Code Status: Full Code      Significant Results:  Imaging:   N/A    Cardiology:  N/A    Microbiology:  N/A    Laboratory:  Lab Results   Component Value Date    CHOL 143 03/17/2020    HDL 38 (L) 03/17/2020    LDLCALC 78 03/17/2020    TRIG 133 03/17/2020       Pending Results: N/A    Consultations:  None      Procedures:   Aneurysm embolization    Medications:      Medication List        START taking these medications      acetaminophen 325 MG tablet  Commonly known as: TYLENOL  Take 2 tablets (650 mg total) by mouth every 4 (four) hours as needed.      ondansetron 8 MG Tbdl  Commonly known as: ZOFRAN-ODT  Take 1 tablet (8 mg total) by mouth every 6 (six) hours as needed.     oxyCODONE 5 MG immediate release tablet  Commonly known as: ROXICODONE  Take 1 tablet (5 mg total) by mouth every 6 (six) hours as needed for Pain.            CHANGE how you take these medications      ticagrelor 60 mg tablet  Commonly known as: BRILINTA  Take 1 tablet (60 mg total) by mouth 2 (two) times daily.  What changed: Another medication with the same name was removed. Continue taking this medication, and follow the directions you see here.            CONTINUE taking these medications      aspirin 325 MG tablet  Take 1 tablet (325 mg total) by mouth once daily.     diphenhydrAMINE 50 MG capsule  Commonly known as: BENADRYL            STOP taking these medications      doxycycline 100 MG capsule  Commonly known as: MONODOX     predniSONE 50 MG Tab  Commonly known as: DELTASONE               Where to Get Your Medications        These medications were sent to [facility]  [address] [address]      Hours: Always Open Phone: [phone]   ondansetron 8 MG Tbdl       You can get these medications from any pharmacy    Bring a paper prescription for each of these medications  oxyCODONE 5 MG immediate release tablet       Information about where to get these medications is not yet available    Ask your nurse or doctor about these medications  acetaminophen 325 MG tablet       Diet: Regular    Activity: As tolerated. No heavy lifting x 2 weeks.     Disposition: Discharged to home in stable condition.    Follow Up Plan:  Follow up with Vascular Neurology x 2 weeks.     This discharge took more than 30 minutes to complete.    Sommer Hatfield PA-C  Neurocritical Care  [facility] - Neuro Critical Care

## 2024-06-15 NOTE — PLAN OF CARE
"[facility] Care Plan    POC reviewed with Gilmar Spears and family at 0300. Pt and wife verbalized understanding. Questions and concerns addressed. No acute events overnight. Pt progressing toward goals. Will continue to monitor. See below and flowsheets for full assessment and VS info.         Is this a stroke patient? no    Neuro:  [place] Coma Scale  Best Eye Response: 4-->(E4) spontaneous  Best Motor Response: 6-->(M6) obeys commands  Best Verbal Response: 5-->(V5) oriented  [place]ake Coma Scale Score: 15  Assessment Qualifiers: no eye obstruction present  Pupil PERRLA: yes     24hr Temp:  [96.6 °F (35.9 °C)-98.4 °F (36.9 °C)]     CV:   Rhythm: normal sinus rhythm  BP goals:   SBP < 140  MAP > 65    Resp:           Plan: N/A    GI/:     Diet/Nutrition Received: NPO  Last Bowel Movement: 06/13/24       Intake/Output Summary (Last 24 hours) at 6/15/2024 0601  Last data filed at 6/14/2024 2005  Gross per 24 hour   Intake 1490.37 ml   Output 520 ml   Net 970.37 ml     Unmeasured Output  Urine Occurrence: 1  Stool Occurrence: 0  Emesis Occurrence: 0  Pad Count: 0    Labs/Accuchecks:  Recent Labs   Lab 06/15/24  0402   WBC 18.47*   RBC 4.14*   HGB 13.2*   HCT 38.8*         Recent Labs   Lab 06/15/24  0402      K 3.5   CO2 23      BUN 11   CREATININE 0.7   ALKPHOS 55   ALT 9*   AST 12   BILITOT 0.2    No results for input(s): "PROTIME", "INR", "APTT", "HEPANTIXA" in the last 168 hours. No results for input(s): "CPK", "CPKMB", "TROPONINI", "MB" in the last 168 hours.    Electrolytes: N/A - electrolytes WDL  Accuchecks: none    Gtts:      LDA/Wounds:    Nurses Note -- 4 Eyes    Is there altered skin present? no       Prevention Measures Documented    Second RN/Staff Member:  Jessica GARCIA    "

## 2024-06-15 NOTE — PLAN OF CARE
Patient advised to call PCP office to schedule seven day hospital follow up appointment. Family to provide transportation home.   06/15/24 1311   Final Note   Assessment Type Final Discharge Note   Anticipated Discharge Disposition Home   Hospital Resources/Appts/Education Provided Provided patient/caregiver with written discharge plan information;Appointments scheduled and added to AVS   Post-Acute Status   Discharge Delays None known at this time     [facility]  Discharge Final Note    Primary Care Provider: Jenny Delacruz FNP-C    Expected Discharge Date: 6/15/2024    Final Discharge Note (most recent)       Final Note - 06/15/24 1311          Final Note    Assessment Type Final Discharge Note (P)      Anticipated Discharge Disposition Home or Self Care (P)      Hospital Resources/Appts/Education Provided Provided patient/caregiver with written discharge plan information;Appointments scheduled and added to AVS (P)         Post-Acute Status    Discharge Delays None known at this time (P)                      Important Message from Medicare             Contact Info       [facility]   Specialty: Vascular Neurology    [address]  [facility] [address]   Phone: [phone]       Next Steps: Follow up in 2 week(s)    Jenny Delacruz FNP-C   Specialty: Family Medicine   Relationship: PCP - General    [facility] [address]   Phone: [phone]       Next Steps: Go in 1 week(s)

## 2024-06-18 ENCOUNTER — TELEPHONE (OUTPATIENT)
Dept: NEUROSURGERY | Facility: CLINIC | Age: 29
End: 2024-06-18
Payer: COMMERCIAL

## 2024-06-18 DIAGNOSIS — Q28.2 ARTERIOVENOUS MALFORMATION OF CEREBRAL VESSELS: Primary | ICD-10-CM

## 2024-06-18 DIAGNOSIS — I63.89 OTHER CEREBRAL INFARCTION: Primary | ICD-10-CM

## 2024-06-18 RX ORDER — METHYLPREDNISOLONE 4 MG/1
TABLET ORAL
Qty: 21 EACH | Refills: 0 | Status: SHIPPED | OUTPATIENT
Start: 2024-06-18 | End: 2024-07-09

## 2024-06-18 RX ORDER — OXYCODONE AND ACETAMINOPHEN 10; 325 MG/1; MG/1
1 TABLET ORAL EVERY 6 HOURS PRN
Qty: 28 TABLET | Refills: 0 | Status: SHIPPED | OUTPATIENT
Start: 2024-06-18

## 2024-06-18 NOTE — TELEPHONE ENCOUNTER
-pt wife called to ask if there was any other medication that pt can take due to migraine  -pt migraine behind left eye and above left ear  -no dizziness, numbness, tingling or neuro changes  -provider notified   -provider called in medication  -pt scheduled for 3mo follow up  -pt wife educated on symptoms to look out for after sx that would be encouraged to go to ER for

## 2024-06-19 ENCOUNTER — TELEPHONE (OUTPATIENT)
Dept: NEUROSURGERY | Facility: CLINIC | Age: 29
End: 2024-06-19
Payer: COMMERCIAL

## 2024-06-19 NOTE — TELEPHONE ENCOUNTER
Mr. Spears underwent an angiogram with Dr. Cardona at [facility] on 06/14/24.  Mrs. Spears' called our office yesterday to notify us that Mr. Spears has had a migraine since he was discharged home on 06/15/24.  At discharge, he was prescribed 10 tablets of oxycodone 5mg every 6 hours PRN and instructed to take OTC acetaminophen 650mg every 4 hours PRN for pain.  Ms. Spears reports that her  has been taking the medication without significant relief.  She states that the oxycodone helps for approximately 2 hours and then his headache returns.  She reports that he is having no weakness, slurred speech, or visual disturbances.  Dr. Cardona was notified.  He asked that I prescribe a Medrol dosepack and Percocet 10mg every 6 hours PRN for pain x 1 week.  Percocet 10mg required a PA and I spoke with [facility] to get the prescription approved (reference #: [id number]).  Mrs. Spears was advised that Mr. Spears seek immediate medical attention if he develops any weakness or changes to his vision or speech.  She verbalized understanding.

## 2024-06-27 LAB
POC ACTIVATED CLOTTING TIME K: 220 SEC (ref 74–137)
POC ACTIVATED CLOTTING TIME K: 281 SEC (ref 74–137)
SAMPLE: ABNORMAL
SAMPLE: ABNORMAL

## 2024-09-18 ENCOUNTER — OFFICE VISIT (OUTPATIENT)
Dept: NEUROSURGERY | Facility: CLINIC | Age: 29
End: 2024-09-18
Payer: COMMERCIAL

## 2024-09-18 ENCOUNTER — HOSPITAL ENCOUNTER (OUTPATIENT)
Dept: RADIOLOGY | Facility: HOSPITAL | Age: 29
Discharge: HOME | End: 2024-09-18
Payer: COMMERCIAL

## 2024-09-18 VITALS
HEIGHT: 71 IN | BODY MASS INDEX: 23.27 KG/M2 | SYSTOLIC BLOOD PRESSURE: 119 MMHG | WEIGHT: 166.25 LBS | DIASTOLIC BLOOD PRESSURE: 66 MMHG | HEART RATE: 72 BPM | RESPIRATION RATE: 18 BRPM

## 2024-09-18 DIAGNOSIS — I67.1 CEREBRAL ANEURYSM, NONRUPTURED: Primary | ICD-10-CM

## 2024-09-18 DIAGNOSIS — I63.89 OTHER CEREBRAL INFARCTION: ICD-10-CM

## 2024-09-18 PROCEDURE — 3008F BODY MASS INDEX DOCD: CPT | Mod: CPTII,S$GLB,, | Performed by: NEUROLOGICAL SURGERY

## 2024-09-18 PROCEDURE — 70546 MR ANGIOGRAPH HEAD W/O&W/DYE: CPT | Mod: TC,PO

## 2024-09-18 PROCEDURE — A9585 GADOBUTROL INJECTION: HCPCS | Mod: PO

## 2024-09-18 PROCEDURE — 99215 OFFICE O/P EST HI 40 MIN: CPT | Mod: S$GLB,,, | Performed by: NEUROLOGICAL SURGERY

## 2024-09-18 PROCEDURE — 3074F SYST BP LT 130 MM HG: CPT | Mod: CPTII,S$GLB,, | Performed by: NEUROLOGICAL SURGERY

## 2024-09-18 PROCEDURE — 1159F MED LIST DOCD IN RCRD: CPT | Mod: CPTII,S$GLB,, | Performed by: NEUROLOGICAL SURGERY

## 2024-09-18 PROCEDURE — 25500020 PHARM REV CODE 255: Mod: PO

## 2024-09-18 PROCEDURE — 3078F DIAST BP <80 MM HG: CPT | Mod: CPTII,S$GLB,, | Performed by: NEUROLOGICAL SURGERY

## 2024-09-18 RX ORDER — GADOBUTROL 604.72 MG/ML
7 INJECTION INTRAVENOUS
Status: COMPLETED | OUTPATIENT
Start: 2024-09-18 | End: 2024-09-18

## 2024-09-18 RX ADMIN — GADOBUTROL 7 ML: 604.72 INJECTION INTRAVENOUS at 10:09
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
## 2025-03-17 ENCOUNTER — TELEPHONE (OUTPATIENT)
Dept: NEUROSURGERY | Facility: CLINIC | Age: 30
End: 2025-03-17
Payer: COMMERCIAL

## 2025-04-02 ENCOUNTER — OFFICE VISIT (OUTPATIENT)
Dept: NEUROSURGERY | Facility: CLINIC | Age: 30
End: 2025-04-02
Payer: COMMERCIAL

## 2025-04-02 VITALS
WEIGHT: 166.25 LBS | BODY MASS INDEX: 23.27 KG/M2 | HEART RATE: 67 BPM | DIASTOLIC BLOOD PRESSURE: 66 MMHG | RESPIRATION RATE: 18 BRPM | SYSTOLIC BLOOD PRESSURE: 108 MMHG | HEIGHT: 71 IN

## 2025-04-02 DIAGNOSIS — I67.1 BRAIN ANEURYSM: Primary | ICD-10-CM

## 2025-04-02 PROCEDURE — 1159F MED LIST DOCD IN RCRD: CPT | Mod: CPTII,S$GLB,, | Performed by: NEUROLOGICAL SURGERY

## 2025-04-02 PROCEDURE — 3008F BODY MASS INDEX DOCD: CPT | Mod: CPTII,S$GLB,, | Performed by: NEUROLOGICAL SURGERY

## 2025-04-02 PROCEDURE — 3074F SYST BP LT 130 MM HG: CPT | Mod: CPTII,S$GLB,, | Performed by: NEUROLOGICAL SURGERY

## 2025-04-02 PROCEDURE — 3078F DIAST BP <80 MM HG: CPT | Mod: CPTII,S$GLB,, | Performed by: NEUROLOGICAL SURGERY

## 2025-04-02 PROCEDURE — 99215 OFFICE O/P EST HI 40 MIN: CPT | Mod: S$GLB,,, | Performed by: NEUROLOGICAL SURGERY
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
